# Patient Record
Sex: MALE | Race: WHITE | NOT HISPANIC OR LATINO
[De-identification: names, ages, dates, MRNs, and addresses within clinical notes are randomized per-mention and may not be internally consistent; named-entity substitution may affect disease eponyms.]

---

## 2020-08-12 PROBLEM — Z00.00 ENCOUNTER FOR PREVENTIVE HEALTH EXAMINATION: Status: ACTIVE | Noted: 2020-08-12

## 2020-08-18 ENCOUNTER — TRANSCRIPTION ENCOUNTER (OUTPATIENT)
Age: 70
End: 2020-08-18

## 2020-08-18 ENCOUNTER — APPOINTMENT (OUTPATIENT)
Dept: CARDIOTHORACIC SURGERY | Facility: CLINIC | Age: 70
End: 2020-08-18
Payer: MEDICARE

## 2020-08-18 VITALS
WEIGHT: 213 LBS | BODY MASS INDEX: 29.82 KG/M2 | HEART RATE: 92 BPM | OXYGEN SATURATION: 98 % | SYSTOLIC BLOOD PRESSURE: 129 MMHG | HEIGHT: 71 IN | TEMPERATURE: 99.1 F | DIASTOLIC BLOOD PRESSURE: 88 MMHG | RESPIRATION RATE: 12 BRPM

## 2020-08-18 DIAGNOSIS — Z86.39 PERSONAL HISTORY OF OTHER ENDOCRINE, NUTRITIONAL AND METABOLIC DISEASE: ICD-10-CM

## 2020-08-18 DIAGNOSIS — Z82.49 FAMILY HISTORY OF ISCHEMIC HEART DISEASE AND OTHER DISEASES OF THE CIRCULATORY SYSTEM: ICD-10-CM

## 2020-08-18 PROCEDURE — 99204 OFFICE O/P NEW MOD 45 MIN: CPT

## 2020-08-18 RX ORDER — MIDODRINE HYDROCHLORIDE 5 MG/1
5 TABLET ORAL
Refills: 0 | Status: ACTIVE | COMMUNITY

## 2020-08-18 RX ORDER — ARIPIPRAZOLE 5 MG/1
5 TABLET ORAL
Refills: 0 | Status: ACTIVE | COMMUNITY

## 2020-08-18 RX ORDER — ROSUVASTATIN CALCIUM 40 MG/1
40 TABLET, FILM COATED ORAL
Refills: 0 | Status: ACTIVE | COMMUNITY

## 2020-08-18 RX ORDER — DULOXETINE HYDROCHLORIDE 60 MG/1
60 CAPSULE, DELAYED RELEASE ORAL
Refills: 0 | Status: ACTIVE | COMMUNITY

## 2020-08-18 RX ORDER — EZETIMIBE 10 MG/1
10 TABLET ORAL
Refills: 0 | Status: ACTIVE | COMMUNITY

## 2020-08-18 RX ORDER — LEVOTHYROXINE SODIUM 50 UG/1
50 TABLET ORAL
Refills: 0 | Status: ACTIVE | COMMUNITY

## 2020-08-18 NOTE — PHYSICAL EXAM
[General Appearance - Alert] : alert [General Appearance - In No Acute Distress] : in no acute distress [Auscultation Breath Sounds / Voice Sounds] : lungs were clear to auscultation bilaterally [Heart Sounds Gallop] : no gallops [Heart Rate And Rhythm] : heart rate was normal and rhythm regular [Heart Sounds] : normal S1 and S2 [Murmurs] : no murmurs [Heart Sounds Pericardial Friction Rub] : no pericardial rub [Abdomen Soft] : soft [Bowel Sounds] : normal bowel sounds [Abdomen Tenderness] : non-tender [Abdomen Mass (___ Cm)] : no abdominal mass palpated [Skin Color & Pigmentation] : normal skin color and pigmentation [] : no rash [Skin Turgor] : normal skin turgor [Sensation] : the sensory exam was normal to light touch and pinprick [No Focal Deficits] : no focal deficits [Deep Tendon Reflexes (DTR)] : deep tendon reflexes were 2+ and symmetric [Impaired Insight] : insight and judgment were intact [Oriented To Time, Place, And Person] : oriented to person, place, and time [Affect] : the affect was normal

## 2020-08-29 NOTE — ASSESSMENT
[FreeTextEntry1] : Mr. Nolan is a 69 y/o male. 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 He had a CT chest and PET scan of a lung nodule. PET was negative and Dr. Ibarra send in for evaluation. F/U with CT chest in 3 months.  \par \par Will present the case at Thoracic Oncology Conference.\par If nodule is supicious by radiographic criteria, will refer for CT FNA.\par \par -FMR

## 2020-08-29 NOTE — HISTORY OF PRESENT ILLNESS
[Dyslipidemia] : Dyslipidemia [Hypertension] : Hypertension [FreeTextEntry1] : Mr. Nolan is a 69 y/o male. 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 He had a CT chest and PET scan of a lung nodule. PET was negative and Dr. Ibarra send in for evaluation.   \par \par \par His healthcare teams is as follows:\par PMD: Fred\par Cardio: Manolios\par Pulm: Accera\par EPS: Isbur\par Skin: Buchen\par

## 2020-08-31 ENCOUNTER — OUTPATIENT (OUTPATIENT)
Dept: OUTPATIENT SERVICES | Facility: HOSPITAL | Age: 70
LOS: 1 days | Discharge: HOME | End: 2020-08-31
Payer: MEDICARE

## 2020-08-31 VITALS
WEIGHT: 216.93 LBS | SYSTOLIC BLOOD PRESSURE: 132 MMHG | HEIGHT: 71 IN | RESPIRATION RATE: 16 BRPM | OXYGEN SATURATION: 96 % | HEART RATE: 79 BPM | TEMPERATURE: 99 F | DIASTOLIC BLOOD PRESSURE: 96 MMHG

## 2020-08-31 DIAGNOSIS — R91.1 SOLITARY PULMONARY NODULE: ICD-10-CM

## 2020-08-31 DIAGNOSIS — Z01.818 ENCOUNTER FOR OTHER PREPROCEDURAL EXAMINATION: ICD-10-CM

## 2020-08-31 DIAGNOSIS — Z89.411 ACQUIRED ABSENCE OF RIGHT GREAT TOE: Chronic | ICD-10-CM

## 2020-08-31 LAB
A1C WITH ESTIMATED AVERAGE GLUCOSE RESULT: 7.1 % — HIGH (ref 4–5.6)
ALBUMIN SERPL ELPH-MCNC: 4.8 G/DL — SIGNIFICANT CHANGE UP (ref 3.5–5.2)
ALP SERPL-CCNC: 40 U/L — SIGNIFICANT CHANGE UP (ref 30–115)
ALT FLD-CCNC: 23 U/L — SIGNIFICANT CHANGE UP (ref 0–41)
ANION GAP SERPL CALC-SCNC: 14 MMOL/L — SIGNIFICANT CHANGE UP (ref 7–14)
APTT BLD: 30.2 SEC — SIGNIFICANT CHANGE UP (ref 27–39.2)
AST SERPL-CCNC: 28 U/L — SIGNIFICANT CHANGE UP (ref 0–41)
BASOPHILS # BLD AUTO: 0.04 K/UL — SIGNIFICANT CHANGE UP (ref 0–0.2)
BASOPHILS NFR BLD AUTO: 0.5 % — SIGNIFICANT CHANGE UP (ref 0–1)
BILIRUB SERPL-MCNC: 0.5 MG/DL — SIGNIFICANT CHANGE UP (ref 0.2–1.2)
BUN SERPL-MCNC: 21 MG/DL — HIGH (ref 10–20)
CALCIUM SERPL-MCNC: 10 MG/DL — SIGNIFICANT CHANGE UP (ref 8.5–10.1)
CHLORIDE SERPL-SCNC: 101 MMOL/L — SIGNIFICANT CHANGE UP (ref 98–110)
CO2 SERPL-SCNC: 23 MMOL/L — SIGNIFICANT CHANGE UP (ref 17–32)
CREAT SERPL-MCNC: 1 MG/DL — SIGNIFICANT CHANGE UP (ref 0.7–1.5)
EOSINOPHIL # BLD AUTO: 0.33 K/UL — SIGNIFICANT CHANGE UP (ref 0–0.7)
EOSINOPHIL NFR BLD AUTO: 4 % — SIGNIFICANT CHANGE UP (ref 0–8)
ESTIMATED AVERAGE GLUCOSE: 157 MG/DL — HIGH (ref 68–114)
GLUCOSE SERPL-MCNC: 115 MG/DL — HIGH (ref 70–99)
HCT VFR BLD CALC: 47.1 % — SIGNIFICANT CHANGE UP (ref 42–52)
HGB BLD-MCNC: 15.7 G/DL — SIGNIFICANT CHANGE UP (ref 14–18)
IMM GRANULOCYTES NFR BLD AUTO: 0.2 % — SIGNIFICANT CHANGE UP (ref 0.1–0.3)
INR BLD: 1 RATIO — SIGNIFICANT CHANGE UP (ref 0.65–1.3)
LYMPHOCYTES # BLD AUTO: 2.68 K/UL — SIGNIFICANT CHANGE UP (ref 1.2–3.4)
LYMPHOCYTES # BLD AUTO: 32.6 % — SIGNIFICANT CHANGE UP (ref 20.5–51.1)
MCHC RBC-ENTMCNC: 30.7 PG — SIGNIFICANT CHANGE UP (ref 27–31)
MCHC RBC-ENTMCNC: 33.3 G/DL — SIGNIFICANT CHANGE UP (ref 32–37)
MCV RBC AUTO: 92.2 FL — SIGNIFICANT CHANGE UP (ref 80–94)
MONOCYTES # BLD AUTO: 0.83 K/UL — HIGH (ref 0.1–0.6)
MONOCYTES NFR BLD AUTO: 10.1 % — HIGH (ref 1.7–9.3)
NEUTROPHILS # BLD AUTO: 4.31 K/UL — SIGNIFICANT CHANGE UP (ref 1.4–6.5)
NEUTROPHILS NFR BLD AUTO: 52.6 % — SIGNIFICANT CHANGE UP (ref 42.2–75.2)
NRBC # BLD: 0 /100 WBCS — SIGNIFICANT CHANGE UP (ref 0–0)
PLATELET # BLD AUTO: 226 K/UL — SIGNIFICANT CHANGE UP (ref 130–400)
POTASSIUM SERPL-MCNC: 4.6 MMOL/L — SIGNIFICANT CHANGE UP (ref 3.5–5)
POTASSIUM SERPL-SCNC: 4.6 MMOL/L — SIGNIFICANT CHANGE UP (ref 3.5–5)
PROT SERPL-MCNC: 7.6 G/DL — SIGNIFICANT CHANGE UP (ref 6–8)
PROTHROM AB SERPL-ACNC: 11.5 SEC — SIGNIFICANT CHANGE UP (ref 9.95–12.87)
RBC # BLD: 5.11 M/UL — SIGNIFICANT CHANGE UP (ref 4.7–6.1)
RBC # FLD: 12.2 % — SIGNIFICANT CHANGE UP (ref 11.5–14.5)
SODIUM SERPL-SCNC: 138 MMOL/L — SIGNIFICANT CHANGE UP (ref 135–146)
WBC # BLD: 8.21 K/UL — SIGNIFICANT CHANGE UP (ref 4.8–10.8)
WBC # FLD AUTO: 8.21 K/UL — SIGNIFICANT CHANGE UP (ref 4.8–10.8)

## 2020-08-31 PROCEDURE — 93010 ELECTROCARDIOGRAM REPORT: CPT

## 2020-08-31 NOTE — H&P PST ADULT - HISTORY OF PRESENT ILLNESS
71 y/o male with PMH of HTN, hypercholesteremia, skin CA, psoriasis, depression, and DM is scheduled for  lung nodule BX on 9/8/2020.  Pt complains of abnormal CT scan of the chest that was done June of 2020. States " I have a 9 mm nodule that needs to be biopsied".   . Denies any chest pain, difficulty breathing, SOB, palpitations, dysuria, URI, or any other infections in the last 2 weeks/1 month. Denies any recent travel, contact, or exposure to any persons with known or suspected COVID-19. Pt also denies COVID testing within the last 2 weeks. Pt advised to self quarantine until day of procedure. Exercise tolerance of 2-3 flights of stairs without dyspnea. BHARATHI reviewed with patient.    Anesthesia Alert  NO--Difficult Airway  NO--History of neck surgery or radiation  NO--Limited ROM of neck  NO--History of Malignant hyperthermia  NO--Personal or family history of Pseudocholinesterase deficiency  NO--Prior Anesthesia Complication  NO--Latex Allergy  NO--Loose teeth  NO--History of Rheumatoid Arthritis  NO--BHARATHI  NO--Other

## 2020-08-31 NOTE — H&P PST ADULT - NSICDXPASTMEDICALHX_GEN_ALL_CORE_FT
PAST MEDICAL HISTORY:  Depression     DM (diabetes mellitus)     High cholesterol     HTN (hypertension)     Psoriasis     Skin cancer

## 2020-09-02 PROBLEM — E11.9 TYPE 2 DIABETES MELLITUS WITHOUT COMPLICATIONS: Chronic | Status: ACTIVE | Noted: 2020-08-31

## 2020-09-02 PROBLEM — I10 ESSENTIAL (PRIMARY) HYPERTENSION: Chronic | Status: ACTIVE | Noted: 2020-08-31

## 2020-09-02 PROBLEM — L40.9 PSORIASIS, UNSPECIFIED: Chronic | Status: ACTIVE | Noted: 2020-08-31

## 2020-09-02 PROBLEM — F32.9 MAJOR DEPRESSIVE DISORDER, SINGLE EPISODE, UNSPECIFIED: Chronic | Status: ACTIVE | Noted: 2020-08-31

## 2020-09-02 PROBLEM — C44.90 UNSPECIFIED MALIGNANT NEOPLASM OF SKIN, UNSPECIFIED: Chronic | Status: ACTIVE | Noted: 2020-08-31

## 2020-09-02 PROBLEM — E78.00 PURE HYPERCHOLESTEROLEMIA, UNSPECIFIED: Chronic | Status: ACTIVE | Noted: 2020-08-31

## 2020-09-05 ENCOUNTER — OUTPATIENT (OUTPATIENT)
Dept: OUTPATIENT SERVICES | Facility: HOSPITAL | Age: 70
LOS: 1 days | Discharge: HOME | End: 2020-09-05
Payer: COMMERCIAL

## 2020-09-05 ENCOUNTER — OUTPATIENT (OUTPATIENT)
Dept: OUTPATIENT SERVICES | Facility: HOSPITAL | Age: 70
LOS: 1 days | Discharge: HOME | End: 2020-09-05

## 2020-09-05 ENCOUNTER — LABORATORY RESULT (OUTPATIENT)
Age: 70
End: 2020-09-05

## 2020-09-05 DIAGNOSIS — Z89.411 ACQUIRED ABSENCE OF RIGHT GREAT TOE: Chronic | ICD-10-CM

## 2020-09-05 DIAGNOSIS — Z11.59 ENCOUNTER FOR SCREENING FOR OTHER VIRAL DISEASES: ICD-10-CM

## 2020-09-08 ENCOUNTER — INPATIENT (INPATIENT)
Facility: HOSPITAL | Age: 70
LOS: 0 days | Discharge: HOME | End: 2020-09-09
Attending: THORACIC SURGERY (CARDIOTHORACIC VASCULAR SURGERY) | Admitting: THORACIC SURGERY (CARDIOTHORACIC VASCULAR SURGERY)
Payer: MEDICARE

## 2020-09-08 ENCOUNTER — RESULT REVIEW (OUTPATIENT)
Age: 70
End: 2020-09-08

## 2020-09-08 VITALS
TEMPERATURE: 98 F | SYSTOLIC BLOOD PRESSURE: 117 MMHG | DIASTOLIC BLOOD PRESSURE: 54 MMHG | HEIGHT: 71 IN | HEART RATE: 70 BPM | WEIGHT: 214.07 LBS | RESPIRATION RATE: 18 BRPM

## 2020-09-08 DIAGNOSIS — Z89.411 ACQUIRED ABSENCE OF RIGHT GREAT TOE: Chronic | ICD-10-CM

## 2020-09-08 DIAGNOSIS — E11.9 TYPE 2 DIABETES MELLITUS WITHOUT COMPLICATIONS: ICD-10-CM

## 2020-09-08 DIAGNOSIS — J95.830 POSTPROCEDURAL HEMORRHAGE OF A RESPIRATORY SYSTEM ORGAN OR STRUCTURE FOLLOWING A RESPIRATORY SYSTEM PROCEDURE: ICD-10-CM

## 2020-09-08 DIAGNOSIS — F32.9 MAJOR DEPRESSIVE DISORDER, SINGLE EPISODE, UNSPECIFIED: ICD-10-CM

## 2020-09-08 DIAGNOSIS — Z79.84 LONG TERM (CURRENT) USE OF ORAL HYPOGLYCEMIC DRUGS: ICD-10-CM

## 2020-09-08 DIAGNOSIS — J95.811 POSTPROCEDURAL PNEUMOTHORAX: ICD-10-CM
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
## 2020-09-08 DIAGNOSIS — Z91.013 ALLERGY TO SEAFOOD: ICD-10-CM

## 2020-09-08 DIAGNOSIS — L40.9 PSORIASIS, UNSPECIFIED: ICD-10-CM

## 2020-09-08 DIAGNOSIS — I10 ESSENTIAL (PRIMARY) HYPERTENSION: ICD-10-CM

## 2020-09-08 DIAGNOSIS — C78.01 SECONDARY MALIGNANT NEOPLASM OF RIGHT LUNG: ICD-10-CM

## 2020-09-08 DIAGNOSIS — E78.00 PURE HYPERCHOLESTEROLEMIA, UNSPECIFIED: ICD-10-CM

## 2020-09-08 DIAGNOSIS — J93.9 PNEUMOTHORAX, UNSPECIFIED: ICD-10-CM

## 2020-09-08 DIAGNOSIS — Z85.820 PERSONAL HISTORY OF MALIGNANT MELANOMA OF SKIN: ICD-10-CM

## 2020-09-08 DIAGNOSIS — Z89.411 ACQUIRED ABSENCE OF RIGHT GREAT TOE: ICD-10-CM

## 2020-09-08 DIAGNOSIS — Z79.82 LONG TERM (CURRENT) USE OF ASPIRIN: ICD-10-CM

## 2020-09-08 LAB
GLUCOSE BLDC GLUCOMTR-MCNC: 135 MG/DL — HIGH (ref 70–99)
GLUCOSE BLDC GLUCOMTR-MCNC: 216 MG/DL — HIGH (ref 70–99)

## 2020-09-08 PROCEDURE — 88305 TISSUE EXAM BY PATHOLOGIST: CPT | Mod: 26

## 2020-09-08 PROCEDURE — 77012 CT SCAN FOR NEEDLE BIOPSY: CPT | Mod: 26

## 2020-09-08 PROCEDURE — 88344 IMHCHEM/IMCYTCHM EA MLT ANTB: CPT | Mod: 26

## 2020-09-08 PROCEDURE — 32557 INSERT CATH PLEURA W/ IMAGE: CPT | Mod: RT

## 2020-09-08 PROCEDURE — 88172 CYTP DX EVAL FNA 1ST EA SITE: CPT | Mod: 26

## 2020-09-08 PROCEDURE — 99221 1ST HOSP IP/OBS SF/LOW 40: CPT

## 2020-09-08 PROCEDURE — 88341 IMHCHEM/IMCYTCHM EA ADD ANTB: CPT | Mod: 26,59

## 2020-09-08 PROCEDURE — 99152 MOD SED SAME PHYS/QHP 5/>YRS: CPT

## 2020-09-08 PROCEDURE — 71045 X-RAY EXAM CHEST 1 VIEW: CPT | Mod: 26

## 2020-09-08 PROCEDURE — 88342 IMHCHEM/IMCYTCHM 1ST ANTB: CPT | Mod: 26,59

## 2020-09-08 RX ORDER — ATORVASTATIN CALCIUM 80 MG/1
80 TABLET, FILM COATED ORAL AT BEDTIME
Refills: 0 | Status: DISCONTINUED | OUTPATIENT
Start: 2020-09-08 | End: 2020-09-09

## 2020-09-08 RX ORDER — HEPARIN SODIUM 5000 [USP'U]/ML
5000 INJECTION INTRAVENOUS; SUBCUTANEOUS EVERY 8 HOURS
Refills: 0 | Status: DISCONTINUED | OUTPATIENT
Start: 2020-09-08 | End: 2020-09-09

## 2020-09-08 RX ORDER — DULOXETINE HYDROCHLORIDE 30 MG/1
30 CAPSULE, DELAYED RELEASE ORAL
Refills: 0 | Status: DISCONTINUED | OUTPATIENT
Start: 2020-09-08 | End: 2020-09-09

## 2020-09-08 RX ORDER — ACETAMINOPHEN 500 MG
650 TABLET ORAL EVERY 6 HOURS
Refills: 0 | Status: DISCONTINUED | OUTPATIENT
Start: 2020-09-08 | End: 2020-09-09

## 2020-09-08 RX ORDER — MIDODRINE HYDROCHLORIDE 2.5 MG/1
5 TABLET ORAL THREE TIMES A DAY
Refills: 0 | Status: DISCONTINUED | OUTPATIENT
Start: 2020-09-08 | End: 2020-09-09

## 2020-09-08 RX ORDER — DEXTROSE 50 % IN WATER 50 %
12.5 SYRINGE (ML) INTRAVENOUS ONCE
Refills: 0 | Status: DISCONTINUED | OUTPATIENT
Start: 2020-09-08 | End: 2020-09-09

## 2020-09-08 RX ORDER — LEVOTHYROXINE SODIUM 125 MCG
50 TABLET ORAL DAILY
Refills: 0 | Status: DISCONTINUED | OUTPATIENT
Start: 2020-09-08 | End: 2020-09-09

## 2020-09-08 RX ORDER — INSULIN LISPRO 100/ML
VIAL (ML) SUBCUTANEOUS
Refills: 0 | Status: DISCONTINUED | OUTPATIENT
Start: 2020-09-08 | End: 2020-09-09

## 2020-09-08 RX ORDER — INSULIN LISPRO 100/ML
2 VIAL (ML) SUBCUTANEOUS ONCE
Refills: 0 | Status: COMPLETED | OUTPATIENT
Start: 2020-09-08 | End: 2020-09-08

## 2020-09-08 RX ORDER — ARIPIPRAZOLE 15 MG/1
5 TABLET ORAL DAILY
Refills: 0 | Status: DISCONTINUED | OUTPATIENT
Start: 2020-09-08 | End: 2020-09-09

## 2020-09-08 RX ORDER — PANTOPRAZOLE SODIUM 20 MG/1
40 TABLET, DELAYED RELEASE ORAL
Refills: 0 | Status: DISCONTINUED | OUTPATIENT
Start: 2020-09-08 | End: 2020-09-09

## 2020-09-08 RX ORDER — SODIUM CHLORIDE 9 MG/ML
1000 INJECTION, SOLUTION INTRAVENOUS
Refills: 0 | Status: DISCONTINUED | OUTPATIENT
Start: 2020-09-08 | End: 2020-09-09

## 2020-09-08 RX ORDER — INFLUENZA VIRUS VACCINE 15; 15; 15; 15 UG/.5ML; UG/.5ML; UG/.5ML; UG/.5ML
0.5 SUSPENSION INTRAMUSCULAR ONCE
Refills: 0 | Status: DISCONTINUED | OUTPATIENT
Start: 2020-09-08 | End: 2020-09-09

## 2020-09-08 RX ORDER — IBUPROFEN 200 MG
400 TABLET ORAL EVERY 6 HOURS
Refills: 0 | Status: DISCONTINUED | OUTPATIENT
Start: 2020-09-08 | End: 2020-09-09

## 2020-09-08 RX ORDER — ASPIRIN/CALCIUM CARB/MAGNESIUM 324 MG
81 TABLET ORAL DAILY
Refills: 0 | Status: DISCONTINUED | OUTPATIENT
Start: 2020-09-08 | End: 2020-09-09

## 2020-09-08 RX ORDER — CHLORHEXIDINE GLUCONATE 213 G/1000ML
1 SOLUTION TOPICAL
Refills: 0 | Status: DISCONTINUED | OUTPATIENT
Start: 2020-09-08 | End: 2020-09-09

## 2020-09-08 RX ORDER — GLUCAGON INJECTION, SOLUTION 0.5 MG/.1ML
1 INJECTION, SOLUTION SUBCUTANEOUS ONCE
Refills: 0 | Status: DISCONTINUED | OUTPATIENT
Start: 2020-09-08 | End: 2020-09-09

## 2020-09-08 RX ORDER — DEXTROSE 50 % IN WATER 50 %
15 SYRINGE (ML) INTRAVENOUS ONCE
Refills: 0 | Status: DISCONTINUED | OUTPATIENT
Start: 2020-09-08 | End: 2020-09-09

## 2020-09-08 RX ADMIN — Medication 2 UNIT(S): at 21:41

## 2020-09-08 RX ADMIN — ATORVASTATIN CALCIUM 80 MILLIGRAM(S): 80 TABLET, FILM COATED ORAL at 21:41

## 2020-09-08 RX ADMIN — HEPARIN SODIUM 5000 UNIT(S): 5000 INJECTION INTRAVENOUS; SUBCUTANEOUS at 21:41

## 2020-09-08 RX ADMIN — DULOXETINE HYDROCHLORIDE 30 MILLIGRAM(S): 30 CAPSULE, DELAYED RELEASE ORAL at 21:42

## 2020-09-08 NOTE — PROGRESS NOTE ADULT - SUBJECTIVE AND OBJECTIVE BOX
INTERVENTIONAL RADIOLOGY BRIEF-OPERATIVE NOTE    Procedure: Right lower lung biopsy    Pre-Op Diagnosis: lung nodule    Post-Op Diagnosis: same    Attending: Mario Alberto  Resident: Dionicio    Anesthesia (type):  [ ] General Anesthesia  [x ] Sedation  [ ] Spinal Anesthesia  [ x] Local/Reg[facility]    Contrast: None    Estimated Blood Loss: Minimal, < 5 cc    Condition:   [ ] Critical  [ ] Serious  [ ] Fair   [ c] Good    Findings/Follow up Plan of Care: Successful core biopsy of RLL nodule, using a [id number] Acheive biopsy system. Pt tolerated the procedure well.    Specimens Removed: as above    Implants: none    Complications: small intraparenchymal hematoma surrounding nodule.    Disposition: Discharge to home      Please call Interventional Radiology [phone] with any questions, concerns, or issues.

## 2020-09-08 NOTE — H&P ADULT - NSHPPHYSICALEXAM_GEN_ALL_CORE
PHYSICAL EXAM:  GENERAL: NAD  CHEST/LUNG: Clear to auscultation bilaterally  HEART: Regular rate and rhythm  ABDOMEN: Soft, Nontender, Nondistended  EXTREMITIES:  No clubbing, cyanosis, or edema

## 2020-09-08 NOTE — H&P ADULT - ATTENDING COMMENTS
CTS Attending:  Patient well known to me  I referred this patient for CT-FNA of a lung nodule, and a pneumothorax developed post biopsy  I arranged to admit the patient to my service for management of thechest tube placed by radiology  I discussed the case with Dr. Ferny Llanes, of interventional radiology    -R

## 2020-09-08 NOTE — H&P ADULT - HISTORY OF PRESENT ILLNESS
70M w/PMHx of HTN, HLD, hx of melanoma s/p amputation of right 1st toe, DM, depression, psoriasis, presented for elective right lung nodule biopsy after screening chest CT which demonstrated a 9 mm nodule in June 2020. Post procedure CXR showed a 4.1 cm pneumothorax on the right, IR placed a 8.5 Fr pigtail catheter and the pneumothorax resolved. Patient admitted for pigtail management, catheter to suction.

## 2020-09-08 NOTE — PROCEDURE NOTE - PROCEDURE FINDINGS AND DETAILS
Successful flouroscopic guided 8.5 Pashto right pleural chest tube. PTX completely resolved and drainage catheter attached to pleuravac.

## 2020-09-08 NOTE — H&P ADULT - NSHPLABSRESULTS_GEN_ALL_CORE
LABS:   POCT Blood Glucose.: 135 mg/dL (08 Sep 2020 08:11)    RADIOLOGY:   < from: Xray Chest 1 View AP/PA (09.08.20 @ 13:32) >  IMPRESSION:  Right apical lateral pneumothorax with an air gap measuring of 4.1 cm.  Right lower lung nodular opacity.    < from: Xray Chest 1 View- PORTABLE-Routine (09.08.20 @ 14:54) >  IMPRESSION:  Decreased right apicolateral pneumothorax with an air gap measuring 2.8 cm, previously measuring 4.1 cm.  Right lower lung nodular opacity.

## 2020-09-08 NOTE — PRE PROCEDURE NOTE - PRE PROCEDURE EVALUATION
Interventional Radiology Outpatient Documentation    PREOPERATIVE DAY OF PROCEDURE EVALUATION:     I have personally seen and examined this patient. I agree with the history and physical which I have reviewed and noted any changes below:     Plan is for CT guided RLL lung biopsy with conscious sedation  09-08-20 @ 08:44    Procedure/ risks/ benefits/ goals/ alternatives were explained. All questions answered. Informed content obtained from patient. Consent placed in chart.

## 2020-09-08 NOTE — H&P ADULT - ASSESSMENT
70M w/PMHx of HTN, HLD, hx of melanoma s/p amputation of right 1st toe, DM, depression, psoriasis, presented for elective right lung nodule biopsy after screening chest CT which demonstrated a 9 mm nodule in June 2020. Patient admitted for iatrogenic pneumothorax s/p 8.5 Fr pigtail catheter placement.     Plan:   -Pigtail catheter to suction   -Place to H2O seal at 6 AM   -Repeat CXR at 7AM   -Vitals monitoring   -Continue home medications  -Regular diet   -Adequate pain control   -HSQ, PTX   -Plan discussed with Dr. Carrillo

## 2020-09-09 ENCOUNTER — TRANSCRIPTION ENCOUNTER (OUTPATIENT)
Age: 70
End: 2020-09-09

## 2020-09-09 VITALS
TEMPERATURE: 98 F | DIASTOLIC BLOOD PRESSURE: 55 MMHG | SYSTOLIC BLOOD PRESSURE: 109 MMHG | RESPIRATION RATE: 20 BRPM | HEART RATE: 73 BPM

## 2020-09-09 LAB
ANION GAP SERPL CALC-SCNC: 11 MMOL/L — SIGNIFICANT CHANGE UP (ref 7–14)
BUN SERPL-MCNC: 20 MG/DL — SIGNIFICANT CHANGE UP (ref 10–20)
CALCIUM SERPL-MCNC: 9.3 MG/DL — SIGNIFICANT CHANGE UP (ref 8.5–10.1)
CHLORIDE SERPL-SCNC: 105 MMOL/L — SIGNIFICANT CHANGE UP (ref 98–110)
CO2 SERPL-SCNC: 22 MMOL/L — SIGNIFICANT CHANGE UP (ref 17–32)
CREAT SERPL-MCNC: 1.1 MG/DL — SIGNIFICANT CHANGE UP (ref 0.7–1.5)
GLUCOSE BLDC GLUCOMTR-MCNC: 144 MG/DL — HIGH (ref 70–99)
GLUCOSE BLDC GLUCOMTR-MCNC: 186 MG/DL — HIGH (ref 70–99)
GLUCOSE SERPL-MCNC: 148 MG/DL — HIGH (ref 70–99)
HCT VFR BLD CALC: 41.8 % — LOW (ref 42–52)
HCV AB S/CO SERPL IA: 0.04 COI — SIGNIFICANT CHANGE UP
HCV AB SERPL-IMP: SIGNIFICANT CHANGE UP
HGB BLD-MCNC: 13.7 G/DL — LOW (ref 14–18)
MCHC RBC-ENTMCNC: 30.8 PG — SIGNIFICANT CHANGE UP (ref 27–31)
MCHC RBC-ENTMCNC: 32.8 G/DL — SIGNIFICANT CHANGE UP (ref 32–37)
MCV RBC AUTO: 93.9 FL — SIGNIFICANT CHANGE UP (ref 80–94)
NRBC # BLD: 0 /100 WBCS — SIGNIFICANT CHANGE UP (ref 0–0)
PLATELET # BLD AUTO: 194 K/UL — SIGNIFICANT CHANGE UP (ref 130–400)
POTASSIUM SERPL-MCNC: 4.5 MMOL/L — SIGNIFICANT CHANGE UP (ref 3.5–5)
POTASSIUM SERPL-SCNC: 4.5 MMOL/L — SIGNIFICANT CHANGE UP (ref 3.5–5)
RBC # BLD: 4.45 M/UL — LOW (ref 4.7–6.1)
RBC # FLD: 12.3 % — SIGNIFICANT CHANGE UP (ref 11.5–14.5)
SODIUM SERPL-SCNC: 138 MMOL/L — SIGNIFICANT CHANGE UP (ref 135–146)
WBC # BLD: 6.84 K/UL — SIGNIFICANT CHANGE UP (ref 4.8–10.8)
WBC # FLD AUTO: 6.84 K/UL — SIGNIFICANT CHANGE UP (ref 4.8–10.8)

## 2020-09-09 PROCEDURE — 71045 X-RAY EXAM CHEST 1 VIEW: CPT | Mod: 26,76

## 2020-09-09 RX ORDER — IBUPROFEN 200 MG
1 TABLET ORAL
Qty: 0 | Refills: 0 | DISCHARGE
Start: 2020-09-09

## 2020-09-09 RX ORDER — ACETAMINOPHEN 500 MG
2 TABLET ORAL
Qty: 0 | Refills: 0 | DISCHARGE
Start: 2020-09-09

## 2020-09-09 RX ADMIN — DULOXETINE HYDROCHLORIDE 30 MILLIGRAM(S): 30 CAPSULE, DELAYED RELEASE ORAL at 06:28

## 2020-09-09 RX ADMIN — ARIPIPRAZOLE 5 MILLIGRAM(S): 15 TABLET ORAL at 14:05

## 2020-09-09 RX ADMIN — Medication 50 MICROGRAM(S): at 05:05

## 2020-09-09 RX ADMIN — Medication 1: at 12:07

## 2020-09-09 RX ADMIN — MIDODRINE HYDROCHLORIDE 5 MILLIGRAM(S): 2.5 TABLET ORAL at 06:28

## 2020-09-09 RX ADMIN — MIDODRINE HYDROCHLORIDE 5 MILLIGRAM(S): 2.5 TABLET ORAL at 12:07

## 2020-09-09 RX ADMIN — CHLORHEXIDINE GLUCONATE 1 APPLICATION(S): 213 SOLUTION TOPICAL at 06:27

## 2020-09-09 RX ADMIN — HEPARIN SODIUM 5000 UNIT(S): 5000 INJECTION INTRAVENOUS; SUBCUTANEOUS at 13:41

## 2020-09-09 RX ADMIN — Medication 81 MILLIGRAM(S): at 12:07

## 2020-09-09 RX ADMIN — PANTOPRAZOLE SODIUM 40 MILLIGRAM(S): 20 TABLET, DELAYED RELEASE ORAL at 06:28

## 2020-09-09 RX ADMIN — HEPARIN SODIUM 5000 UNIT(S): 5000 INJECTION INTRAVENOUS; SUBCUTANEOUS at 06:27

## 2020-09-09 NOTE — PROGRESS NOTE ADULT - SUBJECTIVE AND OBJECTIVE BOX
Interventional Radiology Follow- Up Note      70y Male s/p chest tube placement after lung biopsy  on 9/8  in Interventional Radiology     O/N:     No complaints offered.    Vitals: T(F): 97.2 (09-09-20 @ 05:00), Max: 97.5 (09-08-20 @ 19:30)  HR: 62 (09-09-20 @ 05:00) (62 - 70)  BP: 101/54 (09-09-20 @ 05:00) (101/54 - 117/54)  RR: 18 (09-09-20 @ 05:00) (18 - 18)  SpO2: 95% (09-08-20 @ 20:53) (95% - 95%)  Wt(kg): --    LABS:                        13.7   6.84  )-----------( 194      ( 09 Sep 2020 07:25 )             41.8     09-09    138  |  105  |  20  ----------------------------<  148<H>  4.5   |  22  |  1.1    Ca    9.3      09 Sep 2020 07:25          Culture:   output :    PHYSICAL EXAM:  General: Nontoxic, in NAD  Neuro:  Alert & oriented x 3  CV: +S1+S2 regular rate and rhythm  Lung:  respirations nonlabored, good inspiratory effort    A/P:     70M w/PMHx of HTN, HLD, hx of melanoma s/p amputation of right 1st toe, DM, depression, psoriasis, presented for elective right lung nodule biopsy after screening chest CT which demonstrated a 9 mm nodule in June 2020. Patient admitted for iatrogenic pneumothorax s/p 8.5 Fr pigtail catheter placement.     CXR this am showed no ptx.  IR removed chest tube today.  - if follow up cxr after chest tube removal is negative, pt can be discharged  - d/w resident       Please call Interventional Radiology [phone] with any questions, concerns, or issues regarding above. Interventional Radiology Follow- Up Note      70y Male s/p chest tube placement after lung biopsy  on 9/8  in Interventional Radiology     O/N:     No complaints offered.    Vitals: T(F): 97.2 (09-09-20 @ 05:00), Max: 97.5 (09-08-20 @ 19:30)  HR: 62 (09-09-20 @ 05:00) (62 - 70)  BP: 101/54 (09-09-20 @ 05:00) (101/54 - 117/54)  RR: 18 (09-09-20 @ 05:00) (18 - 18)  SpO2: 95% (09-08-20 @ 20:53) (95% - 95%)  Wt(kg): --    LABS:                        13.7   6.84  )-----------( 194      ( 09 Sep 2020 07:25 )             41.8     09-09    138  |  105  |  20  ----------------------------<  148<H>  4.5   |  22  |  1.1    Ca    9.3      09 Sep 2020 07:25          Culture:   output :    PHYSICAL EXAM:  General: Nontoxic, in NAD  Neuro:  Alert & oriented x 3  CV: +S1+S2 regular rate and rhythm  Lung:  respirations nonlabored, good inspiratory effort    A/P:     70M w/PMHx of HTN, HLD, hx of melanoma s/p amputation of right 1st toe, DM, depression, psoriasis, presented for elective right lung nodule biopsy after screening chest CT which demonstrated a 9 mm nodule in June 2020. Patient admitted for iatrogenic pneumothorax s/p 8.5 Fr pigtail catheter placement.     CXR this am showed no ptx.  IR removed chest tube today.  - Repeat chest x ray stat- if no evidence of pneumothorax, patient may be discharged.   - d/w resident       Please call Interventional Radiology [phone] with any questions, concerns, or issues regarding above.

## 2020-09-09 NOTE — DISCHARGE NOTE PROVIDER - NSDCMRMEDTOKEN_GEN_ALL_CORE_FT
Abilify 5 mg oral tablet: 1 tab(s) orally once a day  acetaminophen 325 mg oral tablet: 2 tab(s) orally every 6 hours, As needed, Temp greater or equal to 38C (100.4F), Mild Pain (1 - 3)  aspirin 81 mg oral tablet: orally once a day  Crestor 40 mg oral tablet: 1 tab(s) orally once a day  Cymbalta 30 mg oral delayed release capsule: 1 cap(s) orally 2 times a day  ibuprofen 400 mg oral tablet: 1 tab(s) orally every 6 hours, As needed, Moderate Pain (4 - 6)  levothyroxine 50 mcg (0.05 mg) oral tablet: 1 tab(s) orally once a day  metFORMIN 500 mg oral tablet: 1 tab(s) orally 3 times a day  midodrine 5 mg oral tablet: 1 tab(s) orally 3 times a day

## 2020-09-09 NOTE — DISCHARGE NOTE PROVIDER - CARE PROVIDER_API CALL
Ricci Carrillo  SURGERY  [address] [place] [address], [place] [address]  Phone: [phone]  Fax: [phone]  Follow Up Time:

## 2020-09-09 NOTE — DISCHARGE NOTE PROVIDER - NSDCCPTREATMENT_GEN_ALL_CORE_FT
PRINCIPAL PROCEDURE  Procedure: Biopsy of right lung  Findings and Treatment: Electively done by DOC

## 2020-09-09 NOTE — DISCHARGE NOTE PROVIDER - NSDCFUADDINST_GEN_ALL_CORE_FT
Please follow up with Dr. Carrillo in his office in 1 week. At the time of your appointment you will need a chest xray to confirm there is no recurrence of this pneumothorax. If you develop trouble breathing, shortness of breath, cough, or any other respiratory issues over the next week before your follow up appointment, please contact MD for further evaluation.     You may resume all home medications, and if having any pain from the IR procedure, use Tylenol and or Ibuprofen as needed, as instructed on the bottle.     You can shower but do not soak your wounds in water. Try to keep dressings clean and dry.

## 2020-09-09 NOTE — DISCHARGE NOTE PROVIDER - NSDCCPCAREPLAN_GEN_ALL_CORE_FT
PRINCIPAL DISCHARGE DIAGNOSIS  Diagnosis: Pneumothorax, right  Assessment and Plan of Treatment: s/p elective IR lung nodule biopsy

## 2020-09-09 NOTE — DISCHARGE NOTE PROVIDER - HOSPITAL COURSE
70M w/PMHx of HTN, HLD, hx of melanoma s/p amputation of right 1st toe, DM, depression, psoriasis, presented for elective right lung nodule biopsy after screening chest CT which demonstrated a 9 mm nodule in June 2020. Patient admitted for iatrogenic pneumothorax s/p 8.5 Fr pigtail catheter placement.         Pneumothorax resolved after pigtail catheter was placed and on suction. Patient monitored overnight with no acute events.        POD1, pigtail was placed to waterseal, and was then later removed by IR. STAT CXR ordered, showing no recurrence of pneumothorax. Patient safe to be discharged home at this time. Cleared by IR and CTU team.

## 2020-09-09 NOTE — DISCHARGE NOTE NURSING/CASE MANAGEMENT/SOCIAL WORK - PATIENT PORTAL LINK FT
You can access the FollowMyHealth Patient Portal offered by [facility] by registering at the following website: [email or web address]. By joining [facility]’s FollowMyHealth portal, you will also be able to view your health information using other applications (apps) compatible with our system.

## 2020-09-09 NOTE — PROGRESS NOTE ADULT - SUBJECTIVE AND OBJECTIVE BOX
Progress Note: General Surgery  Patient: SHYAM CONN , 70y (1950)Male   MRN: [id number]  Location: [address]  Visit: 09-08-20 Inpatient  Date: 09-09-20 @ 10:59    Admit Diagnosis/Chief Complaint: R sided PTX    Procedure/Diagnosis:  s/p IR lung nodule biopsy POD1    Events/ 24h: No acute events overnight. Pain controlled. CT put to [facility] at 6 AM. Repeat CXR ordered, no interval changes.    Vitals: T(F): 97.7 (09-09-20 @ 09:01), Max: 97.7 (09-09-20 @ 09:01)  HR: 66 (09-09-20 @ 09:01)  BP: 121/60 (09-09-20 @ 09:01) (101/54 - 121/60)  RR: 20 (09-09-20 @ 09:01)  SpO2: 95% (09-08-20 @ 20:53)    In:   09-08-20 @ 07:01  -  09-09-20 @ 07:00  --------------------------------------------------------  IN: 420 mL      Out:   09-08-20 @ 07:01  -  09-09-20 @ 07:00  --------------------------------------------------------  OUT:    Voided: 520 mL  Total OUT: 520 mL        Net:   09-08-20 @ 07:01  -  09-09-20 @ 07:00  --------------------------------------------------------  NET: -100 mL        Diet: Diet, Regular:   Consistent Carbohydrate Evening Snack (09-08-20 @ 17:16)    IV Fluids: dextrose 5%. 1000 milliLiter(s) (50 mL/Hr) IV Continuous <Continuous>      Physical Examination:  General Appearance: NAD  HEENT: EOMI, sclera non-icteric.  Heart: RRR   Lungs: CTABL. R sided chest tube in place to waterseal  Abdomen:  Soft, nontender, nondistended.   MSK/Extremities: Warm & well-perfused.   Skin: Warm, dry. No jaundice.       Medications: [Standing]  ARIPiprazole 5 milliGRAM(s) Oral daily  aspirin  chewable 81 milliGRAM(s) Oral daily  atorvastatin 80 milliGRAM(s) Oral at bedtime  chlorhexidine 4% Liquid 1 Application(s) Topical <User Schedule>  dextrose 5%. 1000 milliLiter(s) (50 mL/Hr) IV Continuous <Continuous>  dextrose 50% Injectable 12.5 Gram(s) IV Push once  DULoxetine 30 milliGRAM(s) Oral two times a day  heparin   Injectable 5000 Unit(s) SubCutaneous every 8 hours  influenza   Vaccine 0.5 milliLiter(s) IntraMuscular once  insulin lispro (HumaLOG) corrective regimen sliding scale   SubCutaneous three times a day before meals  levothyroxine 50 MICROGram(s) Oral daily  midodrine. 5 milliGRAM(s) Oral three times a day  pantoprazole    Tablet 40 milliGRAM(s) Oral before breakfast    DVT Prophylaxis: heparin   Injectable 5000 Unit(s) SubCutaneous every 8 hours    GI Prophylaxis: pantoprazole    Tablet 40 milliGRAM(s) Oral before breakfast    Antibiotics:   Anticoagulation:   Medications:[PRN]  acetaminophen   Tablet .. 650 milliGRAM(s) Oral every 6 hours PRN  dextrose 40% Gel 15 Gram(s) Oral once PRN  glucagon  Injectable 1 milliGRAM(s) IntraMuscular once PRN  ibuprofen  Tablet. 400 milliGRAM(s) Oral every 6 hours PRN      Labs:                        13.7   6.84  )-----------( 194      ( 09 Sep 2020 07:25 )             41.8     09-09    138  |  105  |  20  ----------------------------<  148<H>  4.5   |  22  |  1.1    Ca    9.3      09 Sep 2020 07:25      Imaging:   New XRAy @0713 pending official read, but no pneumothorax as read by me. Pigtail in place on right      Assessment:  70y Male patient consulted for pneumothorax  Patient seen and examined at bedside. NAD.     Plan:  CT on waterseal, if patient continues to oxygenate well, consider pulling tube today.        Date/Time: 09-09-20 @ 10:59

## 2020-09-10 LAB — NON-GYNECOLOGICAL CYTOLOGY STUDY: SIGNIFICANT CHANGE UP

## 2020-09-16 ENCOUNTER — APPOINTMENT (OUTPATIENT)
Dept: CARDIOTHORACIC SURGERY | Facility: CLINIC | Age: 70
End: 2020-09-16
Payer: MEDICARE

## 2020-09-16 VITALS
WEIGHT: 214 LBS | SYSTOLIC BLOOD PRESSURE: 141 MMHG | RESPIRATION RATE: 18 BRPM | TEMPERATURE: 98.5 F | DIASTOLIC BLOOD PRESSURE: 83 MMHG | HEART RATE: 96 BPM | BODY MASS INDEX: 29.96 KG/M2 | HEIGHT: 71 IN

## 2020-09-16 DIAGNOSIS — Z86.79 PERSONAL HISTORY OF OTHER DISEASES OF THE CIRCULATORY SYSTEM: ICD-10-CM

## 2020-09-16 DIAGNOSIS — Z86.39 PERSONAL HISTORY OF OTHER ENDOCRINE, NUTRITIONAL AND METABOLIC DISEASE: ICD-10-CM

## 2020-09-16 DIAGNOSIS — R91.1 SOLITARY PULMONARY NODULE: ICD-10-CM

## 2020-09-16 DIAGNOSIS — Z85.820 PERSONAL HISTORY OF MALIGNANT MELANOMA OF SKIN: ICD-10-CM

## 2020-09-16 PROCEDURE — 71046 X-RAY EXAM CHEST 2 VIEWS: CPT | Mod: 26

## 2020-09-16 PROCEDURE — 99214 OFFICE O/P EST MOD 30 MIN: CPT

## 2020-09-16 RX ORDER — METFORMIN HYDROCHLORIDE 500 MG/1
500 TABLET, COATED ORAL
Refills: 0 | Status: ACTIVE | COMMUNITY

## 2020-09-16 NOTE — HISTORY OF PRESENT ILLNESS
[FreeTextEntry1] : Mr. Nolan is a 71 y/o male, never smoker, with history of melanoma to right toe s/p amputation in 2019 @ [facility], presents after having a CT Guided Biopsy of a newly found RLL Nodule ([facility] 6/2020).  PET CT @ [facility] 7/2020 showed FDG avidity to right big toe-most likely post surgical changes and non-FDG avid RLL nodule.\par \par His healthcare teams is as follows:\par PMD: Fulop\par Cardio: Manolios\par Pulm: Accera  \par EPS: Isbur\par Skin: Buchen\par Onc: Sebas ([place])\par

## 2020-09-16 NOTE — ASSESSMENT
[FreeTextEntry1] : Mr. Nolan is a 71 y/o male, never smoker with history of melanoma to right big toe 11/2019, recently found to have an IR biopsy proven carcinoma of a 9mm RLL nodule.  Pending stains for tumor histology. PET/CT showed no  other areas of metastatic disease.  PFTs were completed.\par \par Plan for- Robotic Right VATS, RLL wedge resection, possible lobectomy (pending ICH stains lung primary vs. metastatic melanoma) on October 16 2020\par Preop- Cardiac Clearance (), COVID swab, PFTs (Repeat here)\par F/U with PMD for routine medical care\par \par I explained the procedure in detail, including risks, benefits and alternatives, and the patient agreed to proceed with surgery by signing informed consent forms.\par Surgery will be scheduled and will proceed as soon as presurgical testing is completed.\par \par -FMR\par

## 2020-09-16 NOTE — REASON FOR VISIT
[Follow-Up: _____] : a [unfilled] follow-up visit [Family Member] : family member [FreeTextEntry1] : Lung Nodule; S/P IR guided biopsy, ptx post biopsy

## 2020-09-16 NOTE — DATA REVIEWED
[FreeTextEntry1] : IR Guided Biopsy 9/8/2020: Final DiagnosisLUNG NODULE, RIGHT LOWER LOBE, CT-GUIDED NEEDLE BIOPSY:POSITIVE FOR MALIGNANT CELLS. MALIGNANT NEOPLASM.  IMMUNOHISTOCHEMICAL STAINS PENDING.\par \par \par PET/CT @ [facility] 7/15/2020:  Low grade diffuse uptake in the soft tissue at the right big toe amputated site likely related to postsurgical changes/fibrosis/scarring.  Clinical examination is suggested.  FDG non avid 9mm right lower lobe pulmonary nodule.  Biopsy can be performed to exclude non-FDG avid metastatic disease.\par \par CT @ [facility] 6/19/2020- 9mm RLL nodule\par

## 2020-09-28 ENCOUNTER — OUTPATIENT (OUTPATIENT)
Dept: OUTPATIENT SERVICES | Facility: HOSPITAL | Age: 70
LOS: 1 days | Discharge: HOME | End: 2020-09-28
Payer: MEDICARE

## 2020-09-28 VITALS
TEMPERATURE: 98 F | WEIGHT: 210.98 LBS | HEART RATE: 90 BPM | RESPIRATION RATE: 16 BRPM | DIASTOLIC BLOOD PRESSURE: 88 MMHG | HEIGHT: 70 IN | OXYGEN SATURATION: 97 % | SYSTOLIC BLOOD PRESSURE: 122 MMHG

## 2020-09-28 DIAGNOSIS — Z89.411 ACQUIRED ABSENCE OF RIGHT GREAT TOE: Chronic | ICD-10-CM

## 2020-09-28 DIAGNOSIS — Z01.818 ENCOUNTER FOR OTHER PREPROCEDURAL EXAMINATION: ICD-10-CM

## 2020-09-28 DIAGNOSIS — R91.1 SOLITARY PULMONARY NODULE: ICD-10-CM

## 2020-09-28 LAB
A1C WITH ESTIMATED AVERAGE GLUCOSE RESULT: 7 % — HIGH (ref 4–5.6)
ALBUMIN SERPL ELPH-MCNC: 5 G/DL — SIGNIFICANT CHANGE UP (ref 3.5–5.2)
ALP SERPL-CCNC: 45 U/L — SIGNIFICANT CHANGE UP (ref 30–115)
ALT FLD-CCNC: 26 U/L — SIGNIFICANT CHANGE UP (ref 0–41)
ANION GAP SERPL CALC-SCNC: 12 MMOL/L — SIGNIFICANT CHANGE UP (ref 7–14)
APPEARANCE UR: CLEAR — SIGNIFICANT CHANGE UP
APTT BLD: 33.6 SEC — SIGNIFICANT CHANGE UP (ref 27–39.2)
AST SERPL-CCNC: 29 U/L — SIGNIFICANT CHANGE UP (ref 0–41)
BASOPHILS # BLD AUTO: 0.05 K/UL — SIGNIFICANT CHANGE UP (ref 0–0.2)
BASOPHILS NFR BLD AUTO: 0.6 % — SIGNIFICANT CHANGE UP (ref 0–1)
BILIRUB SERPL-MCNC: 0.4 MG/DL — SIGNIFICANT CHANGE UP (ref 0.2–1.2)
BILIRUB UR-MCNC: NEGATIVE — SIGNIFICANT CHANGE UP
BLD GP AB SCN SERPL QL: SIGNIFICANT CHANGE UP
BUN SERPL-MCNC: 17 MG/DL — SIGNIFICANT CHANGE UP (ref 10–20)
CALCIUM SERPL-MCNC: 10 MG/DL — SIGNIFICANT CHANGE UP (ref 8.5–10.1)
CHLORIDE SERPL-SCNC: 100 MMOL/L — SIGNIFICANT CHANGE UP (ref 98–110)
CO2 SERPL-SCNC: 26 MMOL/L — SIGNIFICANT CHANGE UP (ref 17–32)
COLOR SPEC: YELLOW — SIGNIFICANT CHANGE UP
CREAT SERPL-MCNC: 1.3 MG/DL — SIGNIFICANT CHANGE UP (ref 0.7–1.5)
DIFF PNL FLD: NEGATIVE — SIGNIFICANT CHANGE UP
EOSINOPHIL # BLD AUTO: 0.29 K/UL — SIGNIFICANT CHANGE UP (ref 0–0.7)
EOSINOPHIL NFR BLD AUTO: 3.6 % — SIGNIFICANT CHANGE UP (ref 0–8)
ESTIMATED AVERAGE GLUCOSE: 154 MG/DL — HIGH (ref 68–114)
GLUCOSE SERPL-MCNC: 124 MG/DL — HIGH (ref 70–99)
GLUCOSE UR QL: SIGNIFICANT CHANGE UP
HCT VFR BLD CALC: 47.2 % — SIGNIFICANT CHANGE UP (ref 42–52)
HGB BLD-MCNC: 15.6 G/DL — SIGNIFICANT CHANGE UP (ref 14–18)
IMM GRANULOCYTES NFR BLD AUTO: 0.2 % — SIGNIFICANT CHANGE UP (ref 0.1–0.3)
INR BLD: 0.97 RATIO — SIGNIFICANT CHANGE UP (ref 0.65–1.3)
KETONES UR-MCNC: SIGNIFICANT CHANGE UP
LEUKOCYTE ESTERASE UR-ACNC: NEGATIVE — SIGNIFICANT CHANGE UP
LYMPHOCYTES # BLD AUTO: 2.42 K/UL — SIGNIFICANT CHANGE UP (ref 1.2–3.4)
LYMPHOCYTES # BLD AUTO: 29.7 % — SIGNIFICANT CHANGE UP (ref 20.5–51.1)
MCHC RBC-ENTMCNC: 30.3 PG — SIGNIFICANT CHANGE UP (ref 27–31)
MCHC RBC-ENTMCNC: 33.1 G/DL — SIGNIFICANT CHANGE UP (ref 32–37)
MCV RBC AUTO: 91.7 FL — SIGNIFICANT CHANGE UP (ref 80–94)
MONOCYTES # BLD AUTO: 0.64 K/UL — HIGH (ref 0.1–0.6)
MONOCYTES NFR BLD AUTO: 7.9 % — SIGNIFICANT CHANGE UP (ref 1.7–9.3)
NEUTROPHILS # BLD AUTO: 4.72 K/UL — SIGNIFICANT CHANGE UP (ref 1.4–6.5)
NEUTROPHILS NFR BLD AUTO: 58 % — SIGNIFICANT CHANGE UP (ref 42.2–75.2)
NITRITE UR-MCNC: NEGATIVE — SIGNIFICANT CHANGE UP
NRBC # BLD: 0 /100 WBCS — SIGNIFICANT CHANGE UP (ref 0–0)
NT-PROBNP SERPL-SCNC: 27 PG/ML — SIGNIFICANT CHANGE UP (ref 0–300)
PH UR: 6 — SIGNIFICANT CHANGE UP (ref 5–8)
PLATELET # BLD AUTO: 219 K/UL — SIGNIFICANT CHANGE UP (ref 130–400)
POTASSIUM SERPL-MCNC: 4.5 MMOL/L — SIGNIFICANT CHANGE UP (ref 3.5–5)
POTASSIUM SERPL-SCNC: 4.5 MMOL/L — SIGNIFICANT CHANGE UP (ref 3.5–5)
PROT SERPL-MCNC: 7.7 G/DL — SIGNIFICANT CHANGE UP (ref 6–8)
PROT UR-MCNC: SIGNIFICANT CHANGE UP
PROTHROM AB SERPL-ACNC: 11.1 SEC — SIGNIFICANT CHANGE UP (ref 9.95–12.87)
RBC # BLD: 5.15 M/UL — SIGNIFICANT CHANGE UP (ref 4.7–6.1)
RBC # FLD: 12.2 % — SIGNIFICANT CHANGE UP (ref 11.5–14.5)
SODIUM SERPL-SCNC: 138 MMOL/L — SIGNIFICANT CHANGE UP (ref 135–146)
SP GR SPEC: 1.02 — SIGNIFICANT CHANGE UP (ref 1.01–1.03)
UROBILINOGEN FLD QL: ABNORMAL
WBC # BLD: 8.14 K/UL — SIGNIFICANT CHANGE UP (ref 4.8–10.8)
WBC # FLD AUTO: 8.14 K/UL — SIGNIFICANT CHANGE UP (ref 4.8–10.8)

## 2020-09-28 PROCEDURE — 93010 ELECTROCARDIOGRAM REPORT: CPT | Mod: NC

## 2020-09-28 RX ORDER — METFORMIN HYDROCHLORIDE 850 MG/1
1 TABLET ORAL
Qty: 0 | Refills: 0 | DISCHARGE

## 2020-09-28 NOTE — H&P PST ADULT - HISTORY OF PRESENT ILLNESS
69 Y/O MALE PRESENTS TO PAST WITH HX LUNG CA , PT C/O MALIGNANT MELANOMA METS TO LUNG PT NOW FOR SCHEDULED PROCEDURE ( RIGHT THORACOSCOPY, LUNG RESECTION) . PT DENIES ANY CP SOB PALP COUGH DYSURIA FEVER URI. PT ABLE TO SP 1 FOS W/O SOB  pt denies any covid s/s, or tested positive in the past  pt advised self quarantine till day of procedure  Anesthesia Alert  NO--Difficult Airway  NO--History of neck surgery or radiation  NO--Limited ROM of neck  NO--History of Malignant hyperthermia  NO--No personal or family history of Pseudocholinesterase deficiency.  NO--Prior Anesthesia Complication  NO--Latex Allergy  NO--Loose teeth  NO--History of Rheumatoid Arthritis  NO--BHARATHI  NO--Other_____     71 Y/O MALE PRESENTS TO PAST WITH HX LUNG CA , PT C/O MALIGNANT MELANOMA ( right great toe)  METS TO LUNG.  PT NOW FOR SCHEDULED PROCEDURE ( RIGHT THORACOSCOPY, LUNG RESECTION) . PT DENIES ANY CP SOB PALP COUGH DYSURIA FEVER URI. PT ABLE TO SP 1 FOS W/O SOB  pt denies any covid s/s, or tested positive in the past  pt advised self quarantine till day of procedure  Anesthesia Alert  NO--Difficult Airway  NO--History of neck surgery or radiation  NO--Limited ROM of neck  NO--History of Malignant hyperthermia  NO--No personal or family history of Pseudocholinesterase deficiency.  NO--Prior Anesthesia Complication  NO--Latex Allergy  NO--Loose teeth  NO--History of Rheumatoid Arthritis  NO--BHARATHI  NO--Other_____

## 2020-10-13 ENCOUNTER — LABORATORY RESULT (OUTPATIENT)
Age: 70
End: 2020-10-13

## 2020-10-13 ENCOUNTER — OUTPATIENT (OUTPATIENT)
Dept: OUTPATIENT SERVICES | Facility: HOSPITAL | Age: 70
LOS: 1 days | Discharge: HOME | End: 2020-10-13

## 2020-10-13 DIAGNOSIS — Z11.59 ENCOUNTER FOR SCREENING FOR OTHER VIRAL DISEASES: ICD-10-CM

## 2020-10-13 DIAGNOSIS — Z89.411 ACQUIRED ABSENCE OF RIGHT GREAT TOE: Chronic | ICD-10-CM

## 2020-10-15 ENCOUNTER — OUTPATIENT (OUTPATIENT)
Dept: OUTPATIENT SERVICES | Facility: HOSPITAL | Age: 70
LOS: 1 days | Discharge: HOME | End: 2020-10-15

## 2020-10-15 DIAGNOSIS — R91.1 SOLITARY PULMONARY NODULE: ICD-10-CM

## 2020-10-15 DIAGNOSIS — Z89.411 ACQUIRED ABSENCE OF RIGHT GREAT TOE: Chronic | ICD-10-CM

## 2020-10-16 ENCOUNTER — INPATIENT (INPATIENT)
Facility: HOSPITAL | Age: 70
LOS: 1 days | Discharge: HOME | End: 2020-10-18
Attending: THORACIC SURGERY (CARDIOTHORACIC VASCULAR SURGERY) | Admitting: THORACIC SURGERY (CARDIOTHORACIC VASCULAR SURGERY)
Payer: MEDICARE

## 2020-10-16 ENCOUNTER — APPOINTMENT (OUTPATIENT)
Dept: CARDIOTHORACIC SURGERY | Facility: HOSPITAL | Age: 70
End: 2020-10-16

## 2020-10-16 ENCOUNTER — RESULT REVIEW (OUTPATIENT)
Age: 70
End: 2020-10-16

## 2020-10-16 VITALS
RESPIRATION RATE: 18 BRPM | OXYGEN SATURATION: 99 % | TEMPERATURE: 99 F | DIASTOLIC BLOOD PRESSURE: 70 MMHG | HEART RATE: 70 BPM | HEIGHT: 70 IN | WEIGHT: 210.1 LBS | SYSTOLIC BLOOD PRESSURE: 136 MMHG

## 2020-10-16 DIAGNOSIS — Z89.411 ACQUIRED ABSENCE OF RIGHT GREAT TOE: Chronic | ICD-10-CM

## 2020-10-16 LAB
ABO RH CONFIRMATION: SIGNIFICANT CHANGE UP
ANION GAP SERPL CALC-SCNC: 10 MMOL/L — SIGNIFICANT CHANGE UP (ref 7–14)
ANION GAP SERPL CALC-SCNC: 9 MMOL/L — SIGNIFICANT CHANGE UP (ref 7–14)
BASOPHILS # BLD AUTO: 0.03 K/UL — SIGNIFICANT CHANGE UP (ref 0–0.2)
BASOPHILS NFR BLD AUTO: 0.2 % — SIGNIFICANT CHANGE UP (ref 0–1)
BUN SERPL-MCNC: 16 MG/DL — SIGNIFICANT CHANGE UP (ref 10–20)
BUN SERPL-MCNC: 19 MG/DL — SIGNIFICANT CHANGE UP (ref 10–20)
CALCIUM SERPL-MCNC: 8.2 MG/DL — LOW (ref 8.5–10.1)
CALCIUM SERPL-MCNC: 8.9 MG/DL — SIGNIFICANT CHANGE UP (ref 8.5–10.1)
CHLORIDE SERPL-SCNC: 103 MMOL/L — SIGNIFICANT CHANGE UP (ref 98–110)
CHLORIDE SERPL-SCNC: 104 MMOL/L — SIGNIFICANT CHANGE UP (ref 98–110)
CO2 SERPL-SCNC: 23 MMOL/L — SIGNIFICANT CHANGE UP (ref 17–32)
CO2 SERPL-SCNC: 24 MMOL/L — SIGNIFICANT CHANGE UP (ref 17–32)
CREAT SERPL-MCNC: 0.9 MG/DL — SIGNIFICANT CHANGE UP (ref 0.7–1.5)
CREAT SERPL-MCNC: 1 MG/DL — SIGNIFICANT CHANGE UP (ref 0.7–1.5)
EOSINOPHIL # BLD AUTO: 0.08 K/UL — SIGNIFICANT CHANGE UP (ref 0–0.7)
EOSINOPHIL NFR BLD AUTO: 0.6 % — SIGNIFICANT CHANGE UP (ref 0–8)
GLUCOSE BLDC GLUCOMTR-MCNC: 114 MG/DL — HIGH (ref 70–99)
GLUCOSE BLDC GLUCOMTR-MCNC: 125 MG/DL — HIGH (ref 70–99)
GLUCOSE BLDC GLUCOMTR-MCNC: 126 MG/DL — HIGH (ref 70–99)
GLUCOSE SERPL-MCNC: 135 MG/DL — HIGH (ref 70–99)
GLUCOSE SERPL-MCNC: 143 MG/DL — HIGH (ref 70–99)
HCT VFR BLD CALC: 38.3 % — LOW (ref 42–52)
HCT VFR BLD CALC: 40.6 % — LOW (ref 42–52)
HGB BLD-MCNC: 12.2 G/DL — LOW (ref 14–18)
HGB BLD-MCNC: 13.5 G/DL — LOW (ref 14–18)
IMM GRANULOCYTES NFR BLD AUTO: 0.3 % — SIGNIFICANT CHANGE UP (ref 0.1–0.3)
LYMPHOCYTES # BLD AUTO: 1.42 K/UL — SIGNIFICANT CHANGE UP (ref 1.2–3.4)
LYMPHOCYTES # BLD AUTO: 10.3 % — LOW (ref 20.5–51.1)
MAGNESIUM SERPL-MCNC: 1.6 MG/DL — LOW (ref 1.8–2.4)
MCHC RBC-ENTMCNC: 29.8 PG — SIGNIFICANT CHANGE UP (ref 27–31)
MCHC RBC-ENTMCNC: 30.6 PG — SIGNIFICANT CHANGE UP (ref 27–31)
MCHC RBC-ENTMCNC: 31.9 G/DL — LOW (ref 32–37)
MCHC RBC-ENTMCNC: 33.3 G/DL — SIGNIFICANT CHANGE UP (ref 32–37)
MCV RBC AUTO: 92.1 FL — SIGNIFICANT CHANGE UP (ref 80–94)
MCV RBC AUTO: 93.4 FL — SIGNIFICANT CHANGE UP (ref 80–94)
MONOCYTES # BLD AUTO: 0.8 K/UL — HIGH (ref 0.1–0.6)
MONOCYTES NFR BLD AUTO: 5.8 % — SIGNIFICANT CHANGE UP (ref 1.7–9.3)
NEUTROPHILS # BLD AUTO: 11.36 K/UL — HIGH (ref 1.4–6.5)
NEUTROPHILS NFR BLD AUTO: 82.8 % — HIGH (ref 42.2–75.2)
NRBC # BLD: 0 /100 WBCS — SIGNIFICANT CHANGE UP (ref 0–0)
NRBC # BLD: 0 /100 WBCS — SIGNIFICANT CHANGE UP (ref 0–0)
PHOSPHATE SERPL-MCNC: 3.3 MG/DL — SIGNIFICANT CHANGE UP (ref 2.1–4.9)
PLATELET # BLD AUTO: 170 K/UL — SIGNIFICANT CHANGE UP (ref 130–400)
PLATELET # BLD AUTO: 200 K/UL — SIGNIFICANT CHANGE UP (ref 130–400)
POTASSIUM SERPL-MCNC: 4.1 MMOL/L — SIGNIFICANT CHANGE UP (ref 3.5–5)
POTASSIUM SERPL-MCNC: 4.4 MMOL/L — SIGNIFICANT CHANGE UP (ref 3.5–5)
POTASSIUM SERPL-SCNC: 4.1 MMOL/L — SIGNIFICANT CHANGE UP (ref 3.5–5)
POTASSIUM SERPL-SCNC: 4.4 MMOL/L — SIGNIFICANT CHANGE UP (ref 3.5–5)
RBC # BLD: 4.1 M/UL — LOW (ref 4.7–6.1)
RBC # BLD: 4.41 M/UL — LOW (ref 4.7–6.1)
RBC # FLD: 12.4 % — SIGNIFICANT CHANGE UP (ref 11.5–14.5)
RBC # FLD: 12.6 % — SIGNIFICANT CHANGE UP (ref 11.5–14.5)
SODIUM SERPL-SCNC: 136 MMOL/L — SIGNIFICANT CHANGE UP (ref 135–146)
SODIUM SERPL-SCNC: 137 MMOL/L — SIGNIFICANT CHANGE UP (ref 135–146)
WBC # BLD: 13.73 K/UL — HIGH (ref 4.8–10.8)
WBC # BLD: 9.08 K/UL — SIGNIFICANT CHANGE UP (ref 4.8–10.8)
WBC # FLD AUTO: 13.73 K/UL — HIGH (ref 4.8–10.8)
WBC # FLD AUTO: 9.08 K/UL — SIGNIFICANT CHANGE UP (ref 4.8–10.8)

## 2020-10-16 PROCEDURE — 71045 X-RAY EXAM CHEST 1 VIEW: CPT | Mod: 26

## 2020-10-16 PROCEDURE — 32666 THORACOSCOPY W/WEDGE RESECT: CPT | Mod: RT

## 2020-10-16 PROCEDURE — 88305 TISSUE EXAM BY PATHOLOGIST: CPT | Mod: 26

## 2020-10-16 PROCEDURE — 32666 THORACOSCOPY W/WEDGE RESECT: CPT | Mod: AS,RT

## 2020-10-16 RX ORDER — MIDODRINE HYDROCHLORIDE 2.5 MG/1
1 TABLET ORAL
Qty: 0 | Refills: 0 | DISCHARGE

## 2020-10-16 RX ORDER — CEFAZOLIN SODIUM 1 G
1000 VIAL (EA) INJECTION EVERY 8 HOURS
Refills: 0 | Status: COMPLETED | OUTPATIENT
Start: 2020-10-16 | End: 2020-10-17

## 2020-10-16 RX ORDER — METFORMIN HYDROCHLORIDE 850 MG/1
2 TABLET ORAL
Qty: 0 | Refills: 0 | DISCHARGE

## 2020-10-16 RX ORDER — ACETAMINOPHEN 500 MG
650 TABLET ORAL EVERY 6 HOURS
Refills: 0 | Status: DISCONTINUED | OUTPATIENT
Start: 2020-10-16 | End: 2020-10-18

## 2020-10-16 RX ORDER — ARIPIPRAZOLE 15 MG/1
1 TABLET ORAL
Qty: 0 | Refills: 0 | DISCHARGE

## 2020-10-16 RX ORDER — DEXTROSE 50 % IN WATER 50 %
25 SYRINGE (ML) INTRAVENOUS ONCE
Refills: 0 | Status: DISCONTINUED | OUTPATIENT
Start: 2020-10-16 | End: 2020-10-18

## 2020-10-16 RX ORDER — DEXTROSE 50 % IN WATER 50 %
12.5 SYRINGE (ML) INTRAVENOUS ONCE
Refills: 0 | Status: DISCONTINUED | OUTPATIENT
Start: 2020-10-16 | End: 2020-10-18

## 2020-10-16 RX ORDER — SODIUM CHLORIDE 9 MG/ML
1000 INJECTION INTRAMUSCULAR; INTRAVENOUS; SUBCUTANEOUS
Refills: 0 | Status: DISCONTINUED | OUTPATIENT
Start: 2020-10-16 | End: 2020-10-17

## 2020-10-16 RX ORDER — HEPARIN SODIUM 5000 [USP'U]/ML
5000 INJECTION INTRAVENOUS; SUBCUTANEOUS EVERY 8 HOURS
Refills: 0 | Status: DISCONTINUED | OUTPATIENT
Start: 2020-10-17 | End: 2020-10-18

## 2020-10-16 RX ORDER — HYDROMORPHONE HYDROCHLORIDE 2 MG/ML
0.5 INJECTION INTRAMUSCULAR; INTRAVENOUS; SUBCUTANEOUS
Refills: 0 | Status: DISCONTINUED | OUTPATIENT
Start: 2020-10-16 | End: 2020-10-16

## 2020-10-16 RX ORDER — METFORMIN HYDROCHLORIDE 850 MG/1
0 TABLET ORAL
Qty: 0 | Refills: 0 | DISCHARGE

## 2020-10-16 RX ORDER — NALOXONE HYDROCHLORIDE 4 MG/.1ML
0.1 SPRAY NASAL
Refills: 0 | Status: DISCONTINUED | OUTPATIENT
Start: 2020-10-16 | End: 2020-10-18

## 2020-10-16 RX ORDER — SENNA PLUS 8.6 MG/1
2 TABLET ORAL AT BEDTIME
Refills: 0 | Status: DISCONTINUED | OUTPATIENT
Start: 2020-10-16 | End: 2020-10-18

## 2020-10-16 RX ORDER — DEXTROSE 50 % IN WATER 50 %
15 SYRINGE (ML) INTRAVENOUS ONCE
Refills: 0 | Status: DISCONTINUED | OUTPATIENT
Start: 2020-10-16 | End: 2020-10-18

## 2020-10-16 RX ORDER — ROSUVASTATIN CALCIUM 5 MG/1
1 TABLET ORAL
Qty: 0 | Refills: 0 | DISCHARGE

## 2020-10-16 RX ORDER — GLUCAGON INJECTION, SOLUTION 0.5 MG/.1ML
1 INJECTION, SOLUTION SUBCUTANEOUS ONCE
Refills: 0 | Status: DISCONTINUED | OUTPATIENT
Start: 2020-10-16 | End: 2020-10-18

## 2020-10-16 RX ORDER — MORPHINE SULFATE 50 MG/1
30 CAPSULE, EXTENDED RELEASE ORAL
Refills: 0 | Status: DISCONTINUED | OUTPATIENT
Start: 2020-10-16 | End: 2020-10-17

## 2020-10-16 RX ORDER — DULOXETINE HYDROCHLORIDE 30 MG/1
30 CAPSULE, DELAYED RELEASE ORAL EVERY 12 HOURS
Refills: 0 | Status: DISCONTINUED | OUTPATIENT
Start: 2020-10-16 | End: 2020-10-18

## 2020-10-16 RX ORDER — HYDROMORPHONE HYDROCHLORIDE 2 MG/ML
1 INJECTION INTRAMUSCULAR; INTRAVENOUS; SUBCUTANEOUS
Refills: 0 | Status: DISCONTINUED | OUTPATIENT
Start: 2020-10-16 | End: 2020-10-16

## 2020-10-16 RX ORDER — ARIPIPRAZOLE 15 MG/1
5 TABLET ORAL DAILY
Refills: 0 | Status: DISCONTINUED | OUTPATIENT
Start: 2020-10-17 | End: 2020-10-18

## 2020-10-16 RX ORDER — ASPIRIN/CALCIUM CARB/MAGNESIUM 324 MG
81 TABLET ORAL DAILY
Refills: 0 | Status: DISCONTINUED | OUTPATIENT
Start: 2020-10-17 | End: 2020-10-18

## 2020-10-16 RX ORDER — LEVOTHYROXINE SODIUM 125 MCG
50 TABLET ORAL DAILY
Refills: 0 | Status: DISCONTINUED | OUTPATIENT
Start: 2020-10-17 | End: 2020-10-18

## 2020-10-16 RX ORDER — METFORMIN HYDROCHLORIDE 850 MG/1
1000 TABLET ORAL AT BEDTIME
Refills: 0 | Status: DISCONTINUED | OUTPATIENT
Start: 2020-10-16 | End: 2020-10-16

## 2020-10-16 RX ORDER — ASPIRIN/CALCIUM CARB/MAGNESIUM 324 MG
0 TABLET ORAL
Qty: 0 | Refills: 0 | DISCHARGE

## 2020-10-16 RX ORDER — DULOXETINE HYDROCHLORIDE 30 MG/1
1 CAPSULE, DELAYED RELEASE ORAL
Qty: 0 | Refills: 0 | DISCHARGE

## 2020-10-16 RX ORDER — FAMOTIDINE 10 MG/ML
20 INJECTION INTRAVENOUS EVERY 12 HOURS
Refills: 0 | Status: DISCONTINUED | OUTPATIENT
Start: 2020-10-16 | End: 2020-10-18

## 2020-10-16 RX ORDER — INFLUENZA VIRUS VACCINE 15; 15; 15; 15 UG/.5ML; UG/.5ML; UG/.5ML; UG/.5ML
0.5 SUSPENSION INTRAMUSCULAR ONCE
Refills: 0 | Status: DISCONTINUED | OUTPATIENT
Start: 2020-10-16 | End: 2020-10-18

## 2020-10-16 RX ORDER — LEVOTHYROXINE SODIUM 125 MCG
1 TABLET ORAL
Qty: 0 | Refills: 0 | DISCHARGE

## 2020-10-16 RX ORDER — INSULIN LISPRO 100/ML
VIAL (ML) SUBCUTANEOUS
Refills: 0 | Status: DISCONTINUED | OUTPATIENT
Start: 2020-10-16 | End: 2020-10-18

## 2020-10-16 RX ORDER — SODIUM CHLORIDE 9 MG/ML
1000 INJECTION, SOLUTION INTRAVENOUS
Refills: 0 | Status: DISCONTINUED | OUTPATIENT
Start: 2020-10-16 | End: 2020-10-16

## 2020-10-16 RX ORDER — IPRATROPIUM/ALBUTEROL SULFATE 18-103MCG
3 AEROSOL WITH ADAPTER (GRAM) INHALATION EVERY 6 HOURS
Refills: 0 | Status: DISCONTINUED | OUTPATIENT
Start: 2020-10-16 | End: 2020-10-18

## 2020-10-16 RX ORDER — MIDODRINE HYDROCHLORIDE 2.5 MG/1
5 TABLET ORAL THREE TIMES A DAY
Refills: 0 | Status: DISCONTINUED | OUTPATIENT
Start: 2020-10-16 | End: 2020-10-18

## 2020-10-16 RX ORDER — MEPERIDINE HYDROCHLORIDE 50 MG/ML
12.5 INJECTION INTRAMUSCULAR; INTRAVENOUS; SUBCUTANEOUS
Refills: 0 | Status: DISCONTINUED | OUTPATIENT
Start: 2020-10-16 | End: 2020-10-16

## 2020-10-16 RX ORDER — ONDANSETRON 8 MG/1
4 TABLET, FILM COATED ORAL ONCE
Refills: 0 | Status: DISCONTINUED | OUTPATIENT
Start: 2020-10-16 | End: 2020-10-16

## 2020-10-16 RX ORDER — SODIUM CHLORIDE 9 MG/ML
1000 INJECTION, SOLUTION INTRAVENOUS
Refills: 0 | Status: DISCONTINUED | OUTPATIENT
Start: 2020-10-16 | End: 2020-10-18

## 2020-10-16 RX ORDER — BUPIVACAINE 13.3 MG/ML
20 INJECTION, SUSPENSION, LIPOSOMAL INFILTRATION ONCE
Refills: 0 | Status: DISCONTINUED | OUTPATIENT
Start: 2020-10-16 | End: 2020-10-16

## 2020-10-16 RX ORDER — POLYETHYLENE GLYCOL 3350 17 G/17G
17 POWDER, FOR SOLUTION ORAL DAILY
Refills: 0 | Status: DISCONTINUED | OUTPATIENT
Start: 2020-10-16 | End: 2020-10-18

## 2020-10-16 RX ORDER — ONDANSETRON 8 MG/1
4 TABLET, FILM COATED ORAL EVERY 6 HOURS
Refills: 0 | Status: DISCONTINUED | OUTPATIENT
Start: 2020-10-16 | End: 2020-10-18

## 2020-10-16 RX ORDER — ATORVASTATIN CALCIUM 80 MG/1
40 TABLET, FILM COATED ORAL AT BEDTIME
Refills: 0 | Status: DISCONTINUED | OUTPATIENT
Start: 2020-10-16 | End: 2020-10-18

## 2020-10-16 RX ADMIN — Medication 100 MILLIGRAM(S): at 22:33

## 2020-10-16 RX ADMIN — MORPHINE SULFATE 30 MILLILITER(S): 50 CAPSULE, EXTENDED RELEASE ORAL at 11:45

## 2020-10-16 RX ADMIN — SENNA PLUS 2 TABLET(S): 8.6 TABLET ORAL at 22:32

## 2020-10-16 RX ADMIN — FAMOTIDINE 20 MILLIGRAM(S): 10 INJECTION INTRAVENOUS at 17:36

## 2020-10-16 RX ADMIN — MIDODRINE HYDROCHLORIDE 5 MILLIGRAM(S): 2.5 TABLET ORAL at 17:36

## 2020-10-16 RX ADMIN — ATORVASTATIN CALCIUM 40 MILLIGRAM(S): 80 TABLET, FILM COATED ORAL at 22:33

## 2020-10-16 RX ADMIN — Medication 650 MILLIGRAM(S): at 17:36

## 2020-10-16 RX ADMIN — Medication 3 MILLILITER(S): at 19:22

## 2020-10-16 RX ADMIN — DULOXETINE HYDROCHLORIDE 30 MILLIGRAM(S): 30 CAPSULE, DELAYED RELEASE ORAL at 17:36

## 2020-10-16 RX ADMIN — SODIUM CHLORIDE 55 MILLILITER(S): 9 INJECTION INTRAMUSCULAR; INTRAVENOUS; SUBCUTANEOUS at 11:45

## 2020-10-16 RX ADMIN — Medication 100 MILLIGRAM(S): at 14:59

## 2020-10-16 NOTE — CHART NOTE - NSCHARTNOTEFT_GEN_A_CORE
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     Objective:  Vitals: T(F): 98.6 (10-16-20 @ 14:00), Max: 99 (10-16-20 @ 06:24)  HR: 90 (10-16-20 @ 14:00)  BP: 121/55 (10-16-20 @ 14:00) (121/55 - 146/70)  RR: 17 (10-16-20 @ 14:00)  SpO2: 95% (10-16-20 @ 14:00)  Vent Settings:     In:   10-16-20 @ 07:01  -  10-16-20 @ 15:27  --------------------------------------------------------  IN: 345 mL      IV Fluids: dextrose 5%. 1000 milliLiter(s) (50 mL/Hr) IV Continuous <Continuous>  sodium chloride 0.9%. 1000 milliLiter(s) (55 mL/Hr) IV Continuous <Continuous>      Out:   10-16-20 @ 07:01  -  10-16-20 @ 15:27  --------------------------------------------------------  OUT: 170 mL      EBL:     Voided Urine:   10-16-20 @ 07:01  -  10-16-20 @ 15:27  --------------------------------------------------------  OUT: 170 mL      Thomas Catheter: yes no   Drains:   OPAL:    ,   Chest Tube:   10-16-20 @ 07:01  -  10-16-20 @ 15:27  --------------------------------------------------------  OUT: 70 mL       NG Tube:       Physical Examination:  General Appearance: NAD  HEENT: EOMI, sclera non-icteric.  Heart: RRR  Lungs: CTABL, R sided chest tube in place  Abdomen:  Soft, nontender  MSK/Extremities: Warm & well-perfused. Peripheral pulses intact.  Skin: Warm, dry. No jaundice.   Incisions/Wounds: Dressings in place, clean, dry and intact, no signs of infection/active bleeding/drainage    Medications: [Standing]  acetaminophen   Tablet .. 650 milliGRAM(s) Oral every 6 hours  albuterol/ipratropium for Nebulization 3 milliLiter(s) Nebulizer every 6 hours  atorvastatin 40 milliGRAM(s) Oral at bedtime  ceFAZolin   IVPB 1000 milliGRAM(s) IV Intermittent every 8 hours  dextrose 40% Gel 15 Gram(s) Oral once PRN  dextrose 5%. 1000 milliLiter(s) IV Continuous <Continuous>  dextrose 50% Injectable 12.5 Gram(s) IV Push once  dextrose 50% Injectable 25 Gram(s) IV Push once  dextrose 50% Injectable 25 Gram(s) IV Push once  DULoxetine 30 milliGRAM(s) Oral every 12 hours  famotidine    Tablet 20 milliGRAM(s) Oral every 12 hours  glucagon  Injectable 1 milliGRAM(s) IntraMuscular once PRN  HYDROmorphone  Injectable 0.5 milliGRAM(s) IV Push every 10 minutes PRN  HYDROmorphone  Injectable 1 milliGRAM(s) IV Push every 10 minutes PRN  insulin lispro (HumaLOG) corrective regimen sliding scale   SubCutaneous three times a day before meals  meperidine     Injectable 12.5 milliGRAM(s) IV Push every 10 minutes PRN  metFORMIN 1000 milliGRAM(s) Oral at bedtime  midodrine. 5 milliGRAM(s) Oral three times a day  morphine PCA (5 mG/mL) 30 milliLiter(s) PCA Continuous PCA Continuous  naloxone Injectable 0.1 milliGRAM(s) IV Push every 3 minutes PRN  ondansetron Injectable 4 milliGRAM(s) IV Push every 6 hours PRN  ondansetron Injectable 4 milliGRAM(s) IV Push once PRN  senna 2 Tablet(s) Oral at bedtime  sodium chloride 0.9%. 1000 milliLiter(s) IV Continuous <Continuous>    Medications: [PRN]  acetaminophen   Tablet .. 650 milliGRAM(s) Oral every 6 hours  albuterol/ipratropium for Nebulization 3 milliLiter(s) Nebulizer every 6 hours  atorvastatin 40 milliGRAM(s) Oral at bedtime  ceFAZolin   IVPB 1000 milliGRAM(s) IV Intermittent every 8 hours  dextrose 40% Gel 15 Gram(s) Oral once PRN  dextrose 5%. 1000 milliLiter(s) IV Continuous <Continuous>  dextrose 50% Injectable 12.5 Gram(s) IV Push once  dextrose 50% Injectable 25 Gram(s) IV Push once  dextrose 50% Injectable 25 Gram(s) IV Push once  DULoxetine 30 milliGRAM(s) Oral every 12 hours  famotidine    Tablet 20 milliGRAM(s) Oral every 12 hours  glucagon  Injectable 1 milliGRAM(s) IntraMuscular once PRN  HYDROmorphone  Injectable 0.5 milliGRAM(s) IV Push every 10 minutes PRN  HYDROmorphone  Injectable 1 milliGRAM(s) IV Push every 10 minutes PRN  insulin lispro (HumaLOG) corrective regimen sliding scale   SubCutaneous three times a day before meals  meperidine     Injectable 12.5 milliGRAM(s) IV Push every 10 minutes PRN  metFORMIN 1000 milliGRAM(s) Oral at bedtime  midodrine. 5 milliGRAM(s) Oral three times a day  morphine PCA (5 mG/mL) 30 milliLiter(s) PCA Continuous PCA Continuous  naloxone Injectable 0.1 milliGRAM(s) IV Push every 3 minutes PRN  ondansetron Injectable 4 milliGRAM(s) IV Push every 6 hours PRN  ondansetron Injectable 4 milliGRAM(s) IV Push once PRN  senna 2 Tablet(s) Oral at bedtime  sodium chloride 0.9%. 1000 milliLiter(s) IV Continuous <Continuous>    Labs:                        13.5   13.73 )-----------( 200      ( 16 Oct 2020 13:30 )             40.6     10-16    137  |  104  |  19  ----------------------------<  143<H>  4.4   |  24  |  1.0    Ca    8.9      16 Oct 2020 13:30      Assessment:  70yMale patient S/P RLL wedge resection    Plan:  - Monitor vitals  - Monitor post-op labs and replete as necessary  - Monitor for bowel function  - Continue Pain Medications if necessary  - Continue Antibiotics 3 more doses  - Encourage ambulation as tolerated  - Monitor urine output and trial of void once Thomas removed  - DVT and GI Prophylaxis  - Monitor wound and dressing for changes, redress as needed.      Date/Time: 10-16-20 @ 15:27

## 2020-10-16 NOTE — BRIEF OPERATIVE NOTE - OPERATION/FINDINGS
1 CM PALPABLE ISOLATED NODULE IN THE RLL, RESECTED, MELANOMA CONFIRMED BY FROZEN SECTION  NO OTHER FINDINGS IN THE LUNGS, PLEURA, MEDIASTINUM

## 2020-10-16 NOTE — ASU PREOP CHECKLIST - RESPIRATORY RATE (BREATHS/MIN)
18
I will SWITCH the dose or number of times a day I take the medications listed below when I get home from the hospital:  None

## 2020-10-16 NOTE — ASU PATIENT PROFILE, ADULT - PMH
Depression    DM (diabetes mellitus)    High cholesterol    HTN (hypertension)    Psoriasis    Skin cancer

## 2020-10-16 NOTE — BRIEF OPERATIVE NOTE - NSICDXBRIEFPREOP_GEN_ALL_CORE_FT
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

## 2020-10-17 LAB
ANION GAP SERPL CALC-SCNC: 10 MMOL/L — SIGNIFICANT CHANGE UP (ref 7–14)
BUN SERPL-MCNC: 10 MG/DL — SIGNIFICANT CHANGE UP (ref 10–20)
CALCIUM SERPL-MCNC: 8.9 MG/DL — SIGNIFICANT CHANGE UP (ref 8.5–10.1)
CHLORIDE SERPL-SCNC: 102 MMOL/L — SIGNIFICANT CHANGE UP (ref 98–110)
CO2 SERPL-SCNC: 26 MMOL/L — SIGNIFICANT CHANGE UP (ref 17–32)
CREAT SERPL-MCNC: 1 MG/DL — SIGNIFICANT CHANGE UP (ref 0.7–1.5)
GLUCOSE BLDC GLUCOMTR-MCNC: 124 MG/DL — HIGH (ref 70–99)
GLUCOSE BLDC GLUCOMTR-MCNC: 127 MG/DL — HIGH (ref 70–99)
GLUCOSE BLDC GLUCOMTR-MCNC: 132 MG/DL — HIGH (ref 70–99)
GLUCOSE BLDC GLUCOMTR-MCNC: 147 MG/DL — HIGH (ref 70–99)
GLUCOSE SERPL-MCNC: 150 MG/DL — HIGH (ref 70–99)
HCT VFR BLD CALC: 38.6 % — LOW (ref 42–52)
HGB BLD-MCNC: 12.7 G/DL — LOW (ref 14–18)
MAGNESIUM SERPL-MCNC: 1.9 MG/DL — SIGNIFICANT CHANGE UP (ref 1.8–2.4)
MCHC RBC-ENTMCNC: 31.1 PG — HIGH (ref 27–31)
MCHC RBC-ENTMCNC: 32.9 G/DL — SIGNIFICANT CHANGE UP (ref 32–37)
MCV RBC AUTO: 94.6 FL — HIGH (ref 80–94)
NRBC # BLD: 0 /100 WBCS — SIGNIFICANT CHANGE UP (ref 0–0)
PHOSPHATE SERPL-MCNC: 2.6 MG/DL — SIGNIFICANT CHANGE UP (ref 2.1–4.9)
PLATELET # BLD AUTO: 173 K/UL — SIGNIFICANT CHANGE UP (ref 130–400)
POTASSIUM SERPL-MCNC: 4.5 MMOL/L — SIGNIFICANT CHANGE UP (ref 3.5–5)
POTASSIUM SERPL-SCNC: 4.5 MMOL/L — SIGNIFICANT CHANGE UP (ref 3.5–5)
RBC # BLD: 4.08 M/UL — LOW (ref 4.7–6.1)
RBC # FLD: 12.7 % — SIGNIFICANT CHANGE UP (ref 11.5–14.5)
SODIUM SERPL-SCNC: 138 MMOL/L — SIGNIFICANT CHANGE UP (ref 135–146)
WBC # BLD: 8.85 K/UL — SIGNIFICANT CHANGE UP (ref 4.8–10.8)
WBC # FLD AUTO: 8.85 K/UL — SIGNIFICANT CHANGE UP (ref 4.8–10.8)

## 2020-10-17 PROCEDURE — 71045 X-RAY EXAM CHEST 1 VIEW: CPT | Mod: 26,77

## 2020-10-17 PROCEDURE — 71045 X-RAY EXAM CHEST 1 VIEW: CPT | Mod: 26,76

## 2020-10-17 RX ORDER — OXYCODONE HYDROCHLORIDE 5 MG/1
5 TABLET ORAL EVERY 6 HOURS
Refills: 0 | Status: DISCONTINUED | OUTPATIENT
Start: 2020-10-17 | End: 2020-10-18

## 2020-10-17 RX ORDER — MAGNESIUM SULFATE 500 MG/ML
2 VIAL (ML) INJECTION ONCE
Refills: 0 | Status: COMPLETED | OUTPATIENT
Start: 2020-10-17 | End: 2020-10-17

## 2020-10-17 RX ORDER — KETOROLAC TROMETHAMINE 30 MG/ML
15 SYRINGE (ML) INJECTION EVERY 6 HOURS
Refills: 0 | Status: DISCONTINUED | OUTPATIENT
Start: 2020-10-17 | End: 2020-10-18

## 2020-10-17 RX ADMIN — Medication 3 MILLILITER(S): at 08:03

## 2020-10-17 RX ADMIN — HEPARIN SODIUM 5000 UNIT(S): 5000 INJECTION INTRAVENOUS; SUBCUTANEOUS at 05:29

## 2020-10-17 RX ADMIN — Medication 650 MILLIGRAM(S): at 00:19

## 2020-10-17 RX ADMIN — DULOXETINE HYDROCHLORIDE 30 MILLIGRAM(S): 30 CAPSULE, DELAYED RELEASE ORAL at 05:28

## 2020-10-17 RX ADMIN — Medication 650 MILLIGRAM(S): at 17:23

## 2020-10-17 RX ADMIN — MIDODRINE HYDROCHLORIDE 5 MILLIGRAM(S): 2.5 TABLET ORAL at 12:16

## 2020-10-17 RX ADMIN — Medication 3 MILLILITER(S): at 15:07

## 2020-10-17 RX ADMIN — Medication 650 MILLIGRAM(S): at 05:29

## 2020-10-17 RX ADMIN — Medication 12.5 GRAM(S): at 06:29

## 2020-10-17 RX ADMIN — DULOXETINE HYDROCHLORIDE 30 MILLIGRAM(S): 30 CAPSULE, DELAYED RELEASE ORAL at 17:24

## 2020-10-17 RX ADMIN — Medication 100 MILLIGRAM(S): at 05:28

## 2020-10-17 RX ADMIN — Medication 650 MILLIGRAM(S): at 23:08

## 2020-10-17 RX ADMIN — MIDODRINE HYDROCHLORIDE 5 MILLIGRAM(S): 2.5 TABLET ORAL at 05:28

## 2020-10-17 RX ADMIN — MIDODRINE HYDROCHLORIDE 5 MILLIGRAM(S): 2.5 TABLET ORAL at 17:24

## 2020-10-17 RX ADMIN — Medication 650 MILLIGRAM(S): at 05:28

## 2020-10-17 RX ADMIN — Medication 81 MILLIGRAM(S): at 12:16

## 2020-10-17 RX ADMIN — Medication 650 MILLIGRAM(S): at 12:17

## 2020-10-17 RX ADMIN — ATORVASTATIN CALCIUM 40 MILLIGRAM(S): 80 TABLET, FILM COATED ORAL at 21:15

## 2020-10-17 RX ADMIN — Medication 650 MILLIGRAM(S): at 13:17

## 2020-10-17 RX ADMIN — FAMOTIDINE 20 MILLIGRAM(S): 10 INJECTION INTRAVENOUS at 05:28

## 2020-10-17 RX ADMIN — SODIUM CHLORIDE 55 MILLILITER(S): 9 INJECTION INTRAMUSCULAR; INTRAVENOUS; SUBCUTANEOUS at 05:29

## 2020-10-17 RX ADMIN — Medication 50 MICROGRAM(S): at 05:28

## 2020-10-17 RX ADMIN — HEPARIN SODIUM 5000 UNIT(S): 5000 INJECTION INTRAVENOUS; SUBCUTANEOUS at 21:16

## 2020-10-17 RX ADMIN — FAMOTIDINE 20 MILLIGRAM(S): 10 INJECTION INTRAVENOUS at 17:24

## 2020-10-17 RX ADMIN — ARIPIPRAZOLE 5 MILLIGRAM(S): 15 TABLET ORAL at 12:16

## 2020-10-17 RX ADMIN — HEPARIN SODIUM 5000 UNIT(S): 5000 INJECTION INTRAVENOUS; SUBCUTANEOUS at 13:01

## 2020-10-17 RX ADMIN — SENNA PLUS 2 TABLET(S): 8.6 TABLET ORAL at 21:17

## 2020-10-17 RX ADMIN — POLYETHYLENE GLYCOL 3350 17 GRAM(S): 17 POWDER, FOR SOLUTION ORAL at 12:17

## 2020-10-17 NOTE — PROGRESS NOTE ADULT - SUBJECTIVE AND OBJECTIVE BOX
GENERAL SURGERY PROGRESS NOTE     SHYAM CONN  70y  Male  Hospital day :1d  POD:  Procedure: Thoracoscopy, with lung wedge resection      EVENTS IN THE LAST 24 HRS: No acute events, patient refers feeling well, mild pain, non labored on room air, CT with 110 cc       T(F): 96.1 (10-17-20 @ 01:00), Max: 99 (10-16-20 @ 06:24)  HR: 80 (10-17-20 @ 01:00) (70 - 91)  BP: 135/63 (10-17-20 @ 01:00) (121/55 - 146/70)  ABP: 145/596 (10-16-20 @ 14:00) (139/69 - 166/66)  ABP(mean): 85 (10-16-20 @ 14:00) (85 - 98)  RR: 18 (10-17-20 @ 01:00) (10 - 18)  SpO2: 95% (10-16-20 @ 14:00) (94% - 100%)    PHYSICAL EXAM:  GENERAL: NAD, well-appearing  CHEST/LUNG: Clear to auscultation bilaterally, RT chest tube  HEART: Regular rate and rhythm  ABDOMEN: Soft, Nontender, Nondistended;   EXTREMITIES:  No clubbing, cyanosis, or edema      DIET/FLUIDS: dextrose 5%. 1000 milliLiter(s) IV Continuous <Continuous>  sodium chloride 0.9%. 1000 milliLiter(s) IV Continuous <Continuous>         GI proph:  famotidine    Tablet 20 milliGRAM(s) Oral every 12 hours    AC/ proph: aspirin enteric coated 81 milliGRAM(s) Oral daily  heparin   Injectable 5000 Unit(s) SubCutaneous every 8 hours    ABx: ceFAZolin   IVPB 1000 milliGRAM(s) IV Intermittent every 8 hours          LABS  Labs:  CAPILLARY BLOOD GLUCOSE      POCT Blood Glucose.: 125 mg/dL (16 Oct 2020 20:59)  POCT Blood Glucose.: 126 mg/dL (16 Oct 2020 16:36)  POCT Blood Glucose.: 114 mg/dL (16 Oct 2020 06:35)                          12.2   9.08  )-----------( 170      ( 16 Oct 2020 21:59 )             38.3       Auto Neutrophil %: 82.8 % (10-16-20 @ 13:30)  Auto Immature Granulocyte %: 0.3 % (10-16-20 @ 13:30)    10-16    136  |  103  |  16  ----------------------------<  135<H>  4.1   |  23  |  0.9      Calcium, Total Serum: 8.2 mg/dL (10-16-20 @ 21:59)      LFTs:         Coags:

## 2020-10-17 NOTE — PHYSICAL THERAPY INITIAL EVALUATION ADULT - CRITERIA FOR SKILLED THERAPEUTIC INTERVENTIONS
functional limitations in following categories/therapy frequency/impairments found/anticipated equipment needs at discharge/anticipated discharge recommendation/rehab potential

## 2020-10-17 NOTE — PHYSICAL THERAPY INITIAL EVALUATION ADULT - GAIT TRAINING, PT EVAL
ambulate without assist device indep approx 150 feet in 1 week; negotiate 13 steps with 1 handrail indep in 1 week

## 2020-10-17 NOTE — PROGRESS NOTE ADULT - ASSESSMENT
70 years old male s/p R VATS and RLL wedge resection    Plan:    - Continue with oral diet  - Pain control as needed  - Hemodynamic monitoring as per routine  - Monitoring CT output  - F/U CXR AM  - Encourage ambulation and incentive spirometer use (10x/hr when awake)  - GI and DVT prophylaxis  - Check and replete CBC and BMP   - Strict input and output monitoring  - Continue current management

## 2020-10-17 NOTE — CHART NOTE - NSCHARTNOTEFT_GEN_A_CORE
Patient desaturated to 89% after chest tube placed to waterseal. Nurse put patient on 2L of O2 at the time. Patient saturating 91% on RA. CT Surgery team decided to place chest tube back on suction. Saturation increased to 97% on RA, and while on suction. Will get CXR and continue to monitor saturation. Leave on suction for now.

## 2020-10-17 NOTE — PHYSICAL THERAPY INITIAL EVALUATION ADULT - PERTINENT HX OF CURRENT PROBLEM, REHAB EVAL
71 Y/O MALE PRESENTS TO PAST WITH HX LUNG CA , PT C/O MALIGNANT MELANOMA ( right great toe)  METS TO LUNG.  PT NOW FOR SCHEDULED PROCEDURE ( RIGHT THORACOSCOPY, LUNG RESECTION)

## 2020-10-18 ENCOUNTER — TRANSCRIPTION ENCOUNTER (OUTPATIENT)
Age: 70
End: 2020-10-18

## 2020-10-18 VITALS
HEART RATE: 77 BPM | TEMPERATURE: 96 F | DIASTOLIC BLOOD PRESSURE: 62 MMHG | SYSTOLIC BLOOD PRESSURE: 131 MMHG | RESPIRATION RATE: 18 BRPM

## 2020-10-18 LAB
GLUCOSE BLDC GLUCOMTR-MCNC: 121 MG/DL — HIGH (ref 70–99)
GLUCOSE BLDC GLUCOMTR-MCNC: 136 MG/DL — HIGH (ref 70–99)
GLUCOSE BLDC GLUCOMTR-MCNC: 162 MG/DL — HIGH (ref 70–99)

## 2020-10-18 PROCEDURE — 71045 X-RAY EXAM CHEST 1 VIEW: CPT | Mod: 26,77

## 2020-10-18 PROCEDURE — 71045 X-RAY EXAM CHEST 1 VIEW: CPT | Mod: 26

## 2020-10-18 RX ORDER — OXYCODONE HYDROCHLORIDE 5 MG/1
1 TABLET ORAL
Qty: 20 | Refills: 0
Start: 2020-10-18 | End: 2020-10-22

## 2020-10-18 RX ORDER — ACETAMINOPHEN 500 MG
2 TABLET ORAL
Qty: 0 | Refills: 0 | DISCHARGE
Start: 2020-10-18

## 2020-10-18 RX ADMIN — Medication 650 MILLIGRAM(S): at 11:26

## 2020-10-18 RX ADMIN — HEPARIN SODIUM 5000 UNIT(S): 5000 INJECTION INTRAVENOUS; SUBCUTANEOUS at 05:27

## 2020-10-18 RX ADMIN — Medication 650 MILLIGRAM(S): at 17:09

## 2020-10-18 RX ADMIN — MIDODRINE HYDROCHLORIDE 5 MILLIGRAM(S): 2.5 TABLET ORAL at 17:08

## 2020-10-18 RX ADMIN — MIDODRINE HYDROCHLORIDE 5 MILLIGRAM(S): 2.5 TABLET ORAL at 11:26

## 2020-10-18 RX ADMIN — Medication 2: at 11:30

## 2020-10-18 RX ADMIN — HEPARIN SODIUM 5000 UNIT(S): 5000 INJECTION INTRAVENOUS; SUBCUTANEOUS at 15:14

## 2020-10-18 RX ADMIN — FAMOTIDINE 20 MILLIGRAM(S): 10 INJECTION INTRAVENOUS at 05:27

## 2020-10-18 RX ADMIN — Medication 650 MILLIGRAM(S): at 17:08

## 2020-10-18 RX ADMIN — DULOXETINE HYDROCHLORIDE 30 MILLIGRAM(S): 30 CAPSULE, DELAYED RELEASE ORAL at 05:27

## 2020-10-18 RX ADMIN — DULOXETINE HYDROCHLORIDE 30 MILLIGRAM(S): 30 CAPSULE, DELAYED RELEASE ORAL at 17:08

## 2020-10-18 RX ADMIN — Medication 62.5 MILLIMOLE(S): at 03:42

## 2020-10-18 RX ADMIN — Medication 3 MILLILITER(S): at 08:01

## 2020-10-18 RX ADMIN — Medication 650 MILLIGRAM(S): at 05:27

## 2020-10-18 RX ADMIN — MIDODRINE HYDROCHLORIDE 5 MILLIGRAM(S): 2.5 TABLET ORAL at 05:27

## 2020-10-18 RX ADMIN — Medication 650 MILLIGRAM(S): at 15:12

## 2020-10-18 RX ADMIN — Medication 50 MICROGRAM(S): at 05:27

## 2020-10-18 RX ADMIN — FAMOTIDINE 20 MILLIGRAM(S): 10 INJECTION INTRAVENOUS at 17:08

## 2020-10-18 RX ADMIN — Medication 81 MILLIGRAM(S): at 11:26

## 2020-10-18 RX ADMIN — ARIPIPRAZOLE 5 MILLIGRAM(S): 15 TABLET ORAL at 11:26

## 2020-10-18 RX ADMIN — POLYETHYLENE GLYCOL 3350 17 GRAM(S): 17 POWDER, FOR SOLUTION ORAL at 11:25

## 2020-10-18 NOTE — DISCHARGE NOTE PROVIDER - NSDCMRMEDTOKEN_GEN_ALL_CORE_FT
Abilify 5 mg oral tablet: 1 tab(s) orally once a day  acetaminophen 325 mg oral tablet: 2 tab(s) orally every 6 hours  aspirin 81 mg oral tablet: orally once a day  Crestor 40 mg oral tablet: 1 tab(s) orally once a day  Cymbalta 30 mg oral delayed release capsule: 1 cap(s) orally 2 times a day  levothyroxine 50 mcg (0.05 mg) oral tablet: 1 tab(s) orally once a day  metFORMIN 500 mg oral tablet: 2 tab(s) orally once a day (at bedtime)  metFORMIN 500 mg oral tablet: orally once a day  midodrine 5 mg oral tablet: 1 tab(s) orally 3 times a day  oxyCODONE 5 mg oral tablet: 1 tab(s) orally every 6 hours, As needed, Severe Pain (7 - 10) MDD:4 pills

## 2020-10-18 NOTE — DISCHARGE NOTE PROVIDER - NSDCCPCAREPLAN_GEN_ALL_CORE_FT
PRINCIPAL DISCHARGE DIAGNOSIS  Diagnosis: Lung cancer  Assessment and Plan of Treatment: Diet: Continue regular diet.  Dressings :  OK to shower, keep dressing dry until follow up  Pain : Take, tylenol around the clock (every 8 hours) for at least three days. Take oxycodone 5mg as needed for breakthrough pain. Please be aware, the medication can cause drowsiness, so reserve for night time use.   Activity : Please avoid heavy lifting (anything over 10 pounds) for at least 6 weeks.   Follow up : Call tomorrow (Monday 10/19) to schedule a follow up appointment [phone]

## 2020-10-18 NOTE — PROGRESS NOTE ADULT - ATTENDING COMMENTS
POD # 2   S/p wedge resection of lung for metastatic melanoma    No complaints    Chest tube on water seal  Lung expanded clinically and on X-ray  No air leak clinically    Suggest  D/c chest tube  Discharge planning.

## 2020-10-18 NOTE — DISCHARGE NOTE NURSING/CASE MANAGEMENT/SOCIAL WORK - PATIENT PORTAL LINK FT
You can access the FollowMyHealth Patient Portal offered by [facility] by registering at the following website: [email or web address]. By joining [facility]’s FollowMyHealth portal, you will also be able to view your health information using other applications (apps) compatible with our system.

## 2020-10-18 NOTE — DISCHARGE NOTE PROVIDER - CARE PROVIDER_API CALL
Ricci Carrillo  SURGERY  [address] [place] [address], [facility] [address]  [place] [address]  Phone: [phone]  Fax: [phone]  Follow Up Time: 1-3 days

## 2020-10-18 NOTE — PROGRESS NOTE ADULT - SUBJECTIVE AND OBJECTIVE BOX
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 Pain controlled. Patient placed to waterseal yesterday evening. Saturation remained above 95%. Chest xray obtained, no pneumothorax. Patient breathing comfortable. Patient advanced to soft diet, and tolerating.    Vitals: T(F): 98.2 (10-18-20 @ 01:00), Max: 98.2 (10-18-20 @ 01:00)  HR: 70 (10-18-20 @ 01:00)  BP: 107/51 (10-18-20 @ 01:00) (106/53 - 128/71)  RR: 18 (10-18-20 @ 01:00)  SpO2: 96% (10-17-20 @ 22:18)    In:   10-16-20 @ 07:01  -  10-17-20 @ 07:00  --------------------------------------------------------  IN: 1805 mL    10-17-20 @ 07:01  -  10-18-20 @ 02:27  --------------------------------------------------------  IN: 0 mL      Out:   10-16-20 @ 07:01  -  10-17-20 @ 07:00  --------------------------------------------------------  OUT:    Chest Tube (mL): 110 mL    Voided (mL): 675 mL  Total OUT: 785 mL      10-17-20 @ 07:01  -  10-18-20 @ 02:27  --------------------------------------------------------  OUT:    Voided (mL): 1425 mL  Total OUT: 1425 mL        Net:   10-16-20 @ 07:01  -  10-17-20 @ 07:00  --------------------------------------------------------  NET: 1020 mL    10-17-20 @ 07:01  -  10-18-20 @ 02:27  --------------------------------------------------------  NET: -1425 mL        Diet: Diet, Mechanical Soft:   Consistent Carbohydrate Evening Snack (10-17-20 @ 11:23)    IV Fluids: dextrose 5%. 1000 milliLiter(s) (50 mL/Hr) IV Continuous <Continuous>  sodium phosphate IVPB 15 milliMole(s) IV Intermittent once      Physical Examination:  General Appearance: NAD   HEENT: EOMI, sclera non-icteric.  Heart: RRR   Lungs: CTABL. R chest tube in place to waterseal, no airleak  Abdomen:  Soft, nontender, nondistended.   MSK/Extremities: Warm & well-perfused.   Skin: Warm, dry. No jaundice.       Medications: [Standing]  acetaminophen   Tablet .. 650 milliGRAM(s) Oral every 6 hours  albuterol/ipratropium for Nebulization 3 milliLiter(s) Nebulizer every 6 hours  ARIPiprazole 5 milliGRAM(s) Oral daily  aspirin enteric coated 81 milliGRAM(s) Oral daily  atorvastatin 40 milliGRAM(s) Oral at bedtime  dextrose 5%. 1000 milliLiter(s) (50 mL/Hr) IV Continuous <Continuous>  dextrose 50% Injectable 12.5 Gram(s) IV Push once  dextrose 50% Injectable 25 Gram(s) IV Push once  dextrose 50% Injectable 25 Gram(s) IV Push once  DULoxetine 30 milliGRAM(s) Oral every 12 hours  famotidine    Tablet 20 milliGRAM(s) Oral every 12 hours  heparin   Injectable 5000 Unit(s) SubCutaneous every 8 hours  influenza   Vaccine 0.5 milliLiter(s) IntraMuscular once  insulin lispro (HumaLOG) corrective regimen sliding scale   SubCutaneous three times a day before meals  levothyroxine 50 MICROGram(s) Oral daily  midodrine. 5 milliGRAM(s) Oral three times a day  polyethylene glycol 3350 17 Gram(s) Oral daily  senna 2 Tablet(s) Oral at bedtime  sodium phosphate IVPB 15 milliMole(s) IV Intermittent once    DVT Prophylaxis: heparin   Injectable 5000 Unit(s) SubCutaneous every 8 hours    GI Prophylaxis: famotidine    Tablet 20 milliGRAM(s) Oral every 12 hours    Antibiotics:   Anticoagulation:   Medications:[PRN]  dextrose 40% Gel 15 Gram(s) Oral once PRN  glucagon  Injectable 1 milliGRAM(s) IntraMuscular once PRN  ketorolac   Injectable 15 milliGRAM(s) IV Push every 6 hours PRN  naloxone Injectable 0.1 milliGRAM(s) IV Push every 3 minutes PRN  ondansetron Injectable 4 milliGRAM(s) IV Push every 6 hours PRN  oxyCODONE    IR 5 milliGRAM(s) Oral every 6 hours PRN      Labs:                        12.7   8.85  )-----------( 173      ( 17 Oct 2020 21:24 )             38.6     10-17    138  |  102  |  10  ----------------------------<  150<H>  4.5   |  26  |  1.0    Ca    8.9      17 Oct 2020 21:24  Phos  2.6     10-17  Mg     1.9     10-17          Imaging:   < from: Xray Chest 1 View-PORTABLE IMMEDIATE (Xray Chest 1 View-PORTABLE IMMEDIATE .) (10.17.20 @ 11:00) >  Impression:    Right subcutaneous emphysema, new. Right basilar opacity and left basilar focal atelectasis, stable    < end of copied text >        Assessment:  70y Male patient admitted S/P wedge resection for metastatic cancer.   Patient seen and examined at bedside. NAD.     Plan:  - Follow morning cxr  - Possible removal of CT today  - Adv diet  - Encourage OOB and ambulation  - Monitor vitals  - Monitor labs and replete as necessary  - Monitor for bowel function  - Continue Pain Medications if necessary  - Monitor urine output   - DVT and GI Prophylaxis    Date/Time: 10-18-20 @ 02:27

## 2020-10-19 LAB — SURGICAL PATHOLOGY STUDY: SIGNIFICANT CHANGE UP

## 2020-10-26 PROBLEM — Z09 S/P LUNG SURGERY, FOLLOW-UP EXAM: Status: ACTIVE | Noted: 2020-10-22

## 2020-10-26 PROBLEM — R91.1 LUNG NODULE: Status: RESOLVED | Noted: 2020-08-18 | Resolved: 2020-10-26

## 2020-10-26 PROBLEM — C79.9 METASTATIC MELANOMA: Status: ACTIVE | Noted: 2020-10-26

## 2020-10-28 ENCOUNTER — APPOINTMENT (OUTPATIENT)
Dept: CARDIOTHORACIC SURGERY | Facility: CLINIC | Age: 70
End: 2020-10-28
Payer: MEDICARE

## 2020-10-28 VITALS
OXYGEN SATURATION: 99 % | DIASTOLIC BLOOD PRESSURE: 91 MMHG | RESPIRATION RATE: 19 BRPM | BODY MASS INDEX: 29.96 KG/M2 | SYSTOLIC BLOOD PRESSURE: 151 MMHG | TEMPERATURE: 97.9 F | HEIGHT: 71 IN | WEIGHT: 214 LBS | HEART RATE: 89 BPM

## 2020-10-28 DIAGNOSIS — R91.1 SOLITARY PULMONARY NODULE: ICD-10-CM

## 2020-10-28 DIAGNOSIS — Z09 ENCOUNTER FOR FOLLOW-UP EXAMINATION AFTER COMPLETED TREATMENT FOR CONDITIONS OTHER THAN MALIGNANT NEOPLASM: ICD-10-CM

## 2020-10-28 DIAGNOSIS — C79.9 SECONDARY MALIGNANT NEOPLASM OF UNSPECIFIED SITE: ICD-10-CM

## 2020-10-28 PROCEDURE — 99024 POSTOP FOLLOW-UP VISIT: CPT

## 2020-11-02 DIAGNOSIS — R91.8 OTHER NONSPECIFIC ABNORMAL FINDING OF LUNG FIELD: ICD-10-CM

## 2020-11-02 DIAGNOSIS — C78.01 SECONDARY MALIGNANT NEOPLASM OF RIGHT LUNG: ICD-10-CM

## 2020-11-02 DIAGNOSIS — E78.5 HYPERLIPIDEMIA, UNSPECIFIED: ICD-10-CM

## 2020-11-02 DIAGNOSIS — Z85.820 PERSONAL HISTORY OF MALIGNANT MELANOMA OF SKIN: ICD-10-CM

## 2020-11-02 DIAGNOSIS — I10 ESSENTIAL (PRIMARY) HYPERTENSION: ICD-10-CM

## 2020-11-02 DIAGNOSIS — E11.9 TYPE 2 DIABETES MELLITUS WITHOUT COMPLICATIONS: ICD-10-CM

## 2020-11-02 DIAGNOSIS — Z79.84 LONG TERM (CURRENT) USE OF ORAL HYPOGLYCEMIC DRUGS: ICD-10-CM

## 2020-11-02 DIAGNOSIS — Z91.013 ALLERGY TO SEAFOOD: ICD-10-CM

## 2020-11-02 DIAGNOSIS — E83.42 HYPOMAGNESEMIA: ICD-10-CM

## 2021-01-14 ENCOUNTER — NON-APPOINTMENT (OUTPATIENT)
Age: 71
End: 2021-01-14

## 2021-07-21 NOTE — ASU PREOP CHECKLIST - BP NONINVASIVE SYSTOLIC (MM HG)
136
Warm
Libtayo Counseling- I discussed with the patient the risks of Libtayo including but not limited to nausea, vomiting, diarrhea, and bone or muscle pain.  The patient verbalized understanding of the proper use and possible adverse effects of Libtayo.  All of the patient's questions and concerns were addressed.

## 2022-11-09 ENCOUNTER — NON-APPOINTMENT (OUTPATIENT)
Age: 72
End: 2022-11-09

## 2023-02-03 ENCOUNTER — APPOINTMENT (OUTPATIENT)
Dept: UROLOGY | Facility: CLINIC | Age: 73
End: 2023-02-03
Payer: MEDICARE

## 2023-02-03 VITALS — HEIGHT: 71 IN | WEIGHT: 214 LBS | BODY MASS INDEX: 29.96 KG/M2

## 2023-02-03 DIAGNOSIS — N40.1 BENIGN PROSTATIC HYPERPLASIA WITH LOWER URINARY TRACT SYMPMS: ICD-10-CM

## 2023-02-03 PROCEDURE — 99213 OFFICE O/P EST LOW 20 MIN: CPT

## 2023-03-08 NOTE — ASSESSMENT
[FreeTextEntry1] : 72-year-old with history of BPH, bothersome lower urinary tract symptoms.\par Patient reports symptoms are stable he does not request any treatment.  He has tried Flomax in the past but discontinued due to side effects.  We did discuss daily Cialis today.\par \par History of erectile dysfunction.  Patient does not request any treatment as he is not sexually active.\par \par Blood work reviewed with patient.\par \par Follow-up 1 year\par Kidney bladder sonogram.  Patient can call for results.

## 2023-03-08 NOTE — HISTORY OF PRESENT ILLNESS
[FreeTextEntry1] : Marcos is a 72-year-old male with history of BPH.  He was seen by urologist Dr. Collier a few years ago and was trialed on tamsulosin 0.4 mg daily.  Medication was discontinued as he was developing dizziness with standing.  He states that his most bothersome urinary symptom is urgency when his bladder is full.  He also reports nocturia 2-3 times a night.  Patient does report that the symptoms are stable and he does not feel like he needs any medication for his urinary symptoms.  Patient also has history of erectile dysfunction, however he is not sexually active and does not request any treatment.\par \par Reported hx of kidney stones. No flank pain. \par \par Blood work from January 27, 2023 with primary medical doctor:\par Glucose 111\par EGFR of 74\par Creatinine of 1.06\par Hemoglobin A1c of 5.9\par UA negative for blood, leukocytes, and nitrites\par PSA 0.24\par \par Patient denies family history of prostate cancer

## 2023-07-07 NOTE — H&P PST ADULT - BP NONINVASIVE SYSTOLIC (MM HG)
Patient seen for 45 minute in person session this date. Patient reports he is doing good. Denies any problems occurring since last visit. Did not remember to bring his planner again. Wrote a note for him to remember for next session. Today we worked only on following 3 and 4 step commands. He completed the 3 step commands with 80% acc with mod cues/repetitions but the 4 step dropped to 50% with mod/max cues needed. Discussed need for writing in his planner so he is more able to recall prior days activities with improved details. He reports he will try. Encouraged activities for brain exercises for homework. Will continue. Vicky Traylor.  Lonny Marrero MA/CCC-SLP  [id number]  CPT [phone]

## 2023-09-07 NOTE — BRIEF OPERATIVE NOTE - NSICDXBRIEFPOSTOP_GEN_ALL_CORE_FT
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

## 2024-02-09 ENCOUNTER — APPOINTMENT (OUTPATIENT)
Dept: UROLOGY | Facility: CLINIC | Age: 74
End: 2024-02-09

## 2024-10-03 NOTE — ASU PREOP CHECKLIST - HAIR REMOVAL
Medication:   Requested Prescriptions     Pending Prescriptions Disp Refills    lisinopril (PRINIVIL;ZESTRIL) 2.5 MG tablet [Pharmacy Med Name: LISINOPRIL 2.5 MG TABLET] 90 tablet 0     Sig: TAKE 1 TABLET BY MOUTH DAILY        Last Filled:     07/05/2024        Patient Phone Number: [phone] (home)     Last appt: 8/16/2024   Next appt: Visit date not found    Last OARRS:        No data to display                  
hair removal not indicated
